# Patient Record
Sex: FEMALE | Race: BLACK OR AFRICAN AMERICAN | NOT HISPANIC OR LATINO | Employment: OTHER | URBAN - METROPOLITAN AREA
[De-identification: names, ages, dates, MRNs, and addresses within clinical notes are randomized per-mention and may not be internally consistent; named-entity substitution may affect disease eponyms.]

---

## 2017-01-17 ENCOUNTER — HISTORICAL (OUTPATIENT)
Dept: RADIOLOGY | Facility: HOSPITAL | Age: 32
End: 2017-01-17

## 2022-04-11 ENCOUNTER — HISTORICAL (OUTPATIENT)
Dept: ADMINISTRATIVE | Facility: HOSPITAL | Age: 37
End: 2022-04-11

## 2022-04-24 VITALS
WEIGHT: 293 LBS | SYSTOLIC BLOOD PRESSURE: 120 MMHG | BODY MASS INDEX: 43.4 KG/M2 | DIASTOLIC BLOOD PRESSURE: 87 MMHG | HEIGHT: 69 IN

## 2022-11-18 ENCOUNTER — HOSPITAL ENCOUNTER (EMERGENCY)
Facility: HOSPITAL | Age: 37
Discharge: HOME | End: 2022-11-18
Attending: EMERGENCY MEDICINE

## 2022-11-18 VITALS
DIASTOLIC BLOOD PRESSURE: 90 MMHG | BODY MASS INDEX: 41.02 KG/M2 | OXYGEN SATURATION: 99 % | HEART RATE: 83 BPM | TEMPERATURE: 98 F | RESPIRATION RATE: 16 BRPM | HEIGHT: 71 IN | SYSTOLIC BLOOD PRESSURE: 132 MMHG | WEIGHT: 293 LBS

## 2022-11-18 DIAGNOSIS — R07.9 CHEST PAIN: ICD-10-CM

## 2022-11-18 DIAGNOSIS — R07.89 ATYPICAL CHEST PAIN: Primary | ICD-10-CM

## 2022-11-18 DIAGNOSIS — I10 PRIMARY HYPERTENSION: ICD-10-CM

## 2022-11-18 LAB
ALBUMIN SERPL-MCNC: 3.6 GM/DL (ref 3.5–5)
ALBUMIN/GLOB SERPL: 0.9 RATIO (ref 1.1–2)
ALP SERPL-CCNC: 75 UNIT/L (ref 40–150)
ALT SERPL-CCNC: 35 UNIT/L (ref 0–55)
AST SERPL-CCNC: 21 UNIT/L (ref 5–34)
BASOPHILS # BLD AUTO: 0.03 X10(3)/MCL (ref 0–0.2)
BASOPHILS NFR BLD AUTO: 0.3 %
BILIRUBIN DIRECT+TOT PNL SERPL-MCNC: 0.4 MG/DL
BNP BLD-MCNC: <10 PG/ML
BUN SERPL-MCNC: 9.2 MG/DL (ref 7–18.7)
CALCIUM SERPL-MCNC: 9.3 MG/DL (ref 8.4–10.2)
CHLORIDE SERPL-SCNC: 105 MMOL/L (ref 98–107)
CO2 SERPL-SCNC: 26 MMOL/L (ref 22–29)
CREAT SERPL-MCNC: 0.87 MG/DL (ref 0.55–1.02)
EOSINOPHIL # BLD AUTO: 0.36 X10(3)/MCL (ref 0–0.9)
EOSINOPHIL NFR BLD AUTO: 3.9 %
ERYTHROCYTE [DISTWIDTH] IN BLOOD BY AUTOMATED COUNT: 13.2 % (ref 11.5–17)
GFR SERPLBLD CREATININE-BSD FMLA CKD-EPI: >60 MLS/MIN/1.73/M2
GLOBULIN SER-MCNC: 4.2 GM/DL (ref 2.4–3.5)
GLUCOSE SERPL-MCNC: 76 MG/DL (ref 74–100)
HCT VFR BLD AUTO: 40.8 % (ref 37–47)
HGB BLD-MCNC: 13.6 GM/DL (ref 12–16)
IMM GRANULOCYTES # BLD AUTO: 0.01 X10(3)/MCL (ref 0–0.04)
IMM GRANULOCYTES NFR BLD AUTO: 0.1 %
LYMPHOCYTES # BLD AUTO: 3.02 X10(3)/MCL (ref 0.6–4.6)
LYMPHOCYTES NFR BLD AUTO: 32.9 %
MCH RBC QN AUTO: 29.3 PG (ref 27–31)
MCHC RBC AUTO-ENTMCNC: 33.3 MG/DL (ref 33–36)
MCV RBC AUTO: 87.9 FL (ref 80–94)
MONOCYTES # BLD AUTO: 0.44 X10(3)/MCL (ref 0.1–1.3)
MONOCYTES NFR BLD AUTO: 4.8 %
NEUTROPHILS # BLD AUTO: 5.3 X10(3)/MCL (ref 2.1–9.2)
NEUTROPHILS NFR BLD AUTO: 58 %
NRBC BLD AUTO-RTO: 0 %
PLATELET # BLD AUTO: 275 X10(3)/MCL (ref 130–400)
PMV BLD AUTO: 10.1 FL (ref 7.4–10.4)
POTASSIUM SERPL-SCNC: 4.2 MMOL/L (ref 3.5–5.1)
PROT SERPL-MCNC: 7.8 GM/DL (ref 6.4–8.3)
RBC # BLD AUTO: 4.64 X10(6)/MCL (ref 4.2–5.4)
SODIUM SERPL-SCNC: 140 MMOL/L (ref 136–145)
TROPONIN I SERPL-MCNC: <0.01 NG/ML (ref 0–0.04)
WBC # SPEC AUTO: 9.2 X10(3)/MCL (ref 4.5–11.5)

## 2022-11-18 PROCEDURE — 93005 ELECTROCARDIOGRAM TRACING: CPT

## 2022-11-18 PROCEDURE — 99285 EMERGENCY DEPT VISIT HI MDM: CPT | Mod: 25

## 2022-11-18 PROCEDURE — 83880 ASSAY OF NATRIURETIC PEPTIDE: CPT | Performed by: EMERGENCY MEDICINE

## 2022-11-18 PROCEDURE — 80053 COMPREHEN METABOLIC PANEL: CPT | Performed by: EMERGENCY MEDICINE

## 2022-11-18 PROCEDURE — 85025 COMPLETE CBC W/AUTO DIFF WBC: CPT | Performed by: EMERGENCY MEDICINE

## 2022-11-18 PROCEDURE — 84484 ASSAY OF TROPONIN QUANT: CPT | Performed by: EMERGENCY MEDICINE

## 2022-11-18 PROCEDURE — 93010 EKG 12-LEAD: ICD-10-PCS | Mod: ,,, | Performed by: INTERNAL MEDICINE

## 2022-11-18 PROCEDURE — 93010 ELECTROCARDIOGRAM REPORT: CPT | Mod: ,,, | Performed by: INTERNAL MEDICINE

## 2022-11-18 RX ORDER — LISINOPRIL 20 MG/1
20 TABLET ORAL DAILY
Qty: 90 TABLET | Refills: 3 | Status: SHIPPED | OUTPATIENT
Start: 2022-11-18 | End: 2023-11-18

## 2022-11-18 NOTE — ED TRIAGE NOTES
"Pt complaint of "chest tightness/pains with increased HR over the past 3 days" Pt presents witho chest tightness/pain at present  "

## 2022-11-18 NOTE — ED PROVIDER NOTES
"Encounter Date: 11/18/2022       History     Chief Complaint   Patient presents with    Chest Pain     Pt complaint of "chest tightness/pains with increased HR over the past 3 days"     The history is provided by the patient. No  was used.   Chest Pain  The current episode started several days ago. Duration of episode(s) is 5 seconds. Chest pain occurs intermittently. The chest pain is unchanged. The chest pain is currently at 0/10. The quality of the pain is described as sharp. The pain does not radiate. Pertinent negatives for primary symptoms include no fever, no shortness of breath and no nausea.   Pertinent negatives for associated symptoms include no weakness. She tried nothing for the symptoms. Risk factors include obesity.   Her past medical history is significant for hypertension.   Admits being out of BP medication "for a good while".  CP episodes had been fleeting, lasting 5 - 10 seconds, but had an episode that lasted about 5 minutes this AM.    Review of patient's allergies indicates:  No Known Allergies  No past medical history on file. HTN  No past surgical history on file. Ectopic pregnancy; lap kathie  No family history on file.     Review of Systems   Constitutional:  Negative for fever.   HENT:  Negative for sore throat.    Respiratory:  Negative for shortness of breath.    Cardiovascular:  Positive for chest pain.   Gastrointestinal:  Negative for nausea.   Genitourinary:  Negative for dysuria.   Musculoskeletal:  Negative for back pain.   Skin:  Negative for rash.   Neurological:  Negative for weakness.   Hematological:  Does not bruise/bleed easily.     Physical Exam     Initial Vitals [11/18/22 1220]   BP Pulse Resp Temp SpO2   (!) 156/123 93 18 98 °F (36.7 °C) 100 %      MAP       --         Physical Exam    Nursing note and vitals reviewed.  Constitutional: She appears well-developed and well-nourished.   HENT:   Head: Normocephalic and atraumatic.   Right Ear: External " ear normal.   Left Ear: External ear normal.   Eyes: Conjunctivae and EOM are normal. Pupils are equal, round, and reactive to light.   Neck: Neck supple.   Normal range of motion.  Cardiovascular:  Normal rate, regular rhythm, normal heart sounds and intact distal pulses.           Pulmonary/Chest: Breath sounds normal.   Abdominal: Abdomen is soft. Bowel sounds are normal.   obese   Musculoskeletal:         General: Normal range of motion.      Cervical back: Normal range of motion and neck supple.     Neurological: She is alert and oriented to person, place, and time. GCS score is 15. GCS eye subscore is 4. GCS verbal subscore is 5. GCS motor subscore is 6.   Skin: Skin is warm and dry. Capillary refill takes less than 2 seconds.   Psychiatric: She has a normal mood and affect. Her behavior is normal. Judgment and thought content normal.       ED Course   Procedures  Labs Reviewed   COMPREHENSIVE METABOLIC PANEL - Abnormal; Notable for the following components:       Result Value    Globulin 4.2 (*)     Albumin/Globulin Ratio 0.9 (*)     All other components within normal limits   TROPONIN I - Normal   B-TYPE NATRIURETIC PEPTIDE - Normal   CBC W/ AUTO DIFFERENTIAL    Narrative:     The following orders were created for panel order CBC auto differential.  Procedure                               Abnormality         Status                     ---------                               -----------         ------                     CBC with Differential[[id number]]                            Final result                 Please view results for these tests on the individual orders.   CBC WITH DIFFERENTIAL     EKG Readings: (Independently Interpreted)   Initial Reading: No STEMI. Rhythm: Normal Sinus Rhythm. Heart Rate: 92. Ectopy: No Ectopy. Conduction: Normal. ST Segments: Normal ST Segments. T Waves: Normal. Axis: Normal. Clinical Impression: Normal Sinus Rhythm     Imaging Results              X-Ray Chest AP Portable  (Final result)  Result time 11/18/22 13:30:32      Final result by Theresa Ray MD (11/18/22 13:30:32)                   Impression:      No acute abnormality of the chest.      Electronically signed by: Theresa Ray  Date:    11/18/2022  Time:    13:30               Narrative:    EXAMINATION:  XR CHEST AP PORTABLE    CLINICAL HISTORY:  chest pain;    COMPARISON:  Chest x-ray dated 05/09/2021    FINDINGS:  The heart is normal in size.  The lungs are clear without focal consolidation.  There is no pleural effusion or visible pneumothorax.                                       Medications - No data to display                           Clinical Impression:   Final diagnoses:  [R07.9] Chest pain  [R07.89] Atypical chest pain (Primary)  [I10] Primary hypertension      ED Disposition Condition    Discharge Stable          ED Prescriptions       Medication Sig Dispense Start Date End Date Auth. Provider    lisinopriL (PRINIVIL,ZESTRIL) 20 MG tablet Take 1 tablet (20 mg total) by mouth once daily. 90 tablet 11/18/2022 11/18/2023 Minh Mcgee MD          Follow-up Information       Follow up With Specialties Details Why Contact Info    Follow up with your primary care provider in 2 weeks if not improved                 Minh Mcgee MD  11/18/22 7508

## 2023-09-18 DIAGNOSIS — R19.03 RIGHT LOWER QUADRANT ABDOMINAL SWELLING, MASS AND LUMP: Primary | ICD-10-CM

## 2023-09-29 ENCOUNTER — HOSPITAL ENCOUNTER (OUTPATIENT)
Dept: RADIOLOGY | Facility: HOSPITAL | Age: 38
Discharge: HOME | End: 2023-09-29
Attending: NURSE PRACTITIONER
Payer: MEDICAID

## 2023-09-29 DIAGNOSIS — R19.03 RIGHT LOWER QUADRANT ABDOMINAL SWELLING, MASS AND LUMP: ICD-10-CM

## 2023-09-29 PROCEDURE — 76856 US EXAM PELVIC COMPLETE: CPT | Mod: TC

## 2023-11-26 ENCOUNTER — HOSPITAL ENCOUNTER (EMERGENCY)
Facility: HOSPITAL | Age: 38
Discharge: HOME | End: 2023-11-26
Attending: EMERGENCY MEDICINE
Payer: MEDICAID

## 2023-11-26 VITALS
SYSTOLIC BLOOD PRESSURE: 166 MMHG | BODY MASS INDEX: 43.4 KG/M2 | OXYGEN SATURATION: 99 % | HEIGHT: 69 IN | RESPIRATION RATE: 20 BRPM | TEMPERATURE: 99 F | WEIGHT: 293 LBS | HEART RATE: 98 BPM | DIASTOLIC BLOOD PRESSURE: 106 MMHG

## 2023-11-26 DIAGNOSIS — J02.9 PHARYNGITIS, UNSPECIFIED ETIOLOGY: Primary | ICD-10-CM

## 2023-11-26 DIAGNOSIS — I10 SEVERE HYPERTENSION: ICD-10-CM

## 2023-11-26 LAB
FLUAV AG UPPER RESP QL IA.RAPID: NOT DETECTED
FLUBV AG UPPER RESP QL IA.RAPID: NOT DETECTED
RSV A 5' UTR RNA NPH QL NAA+PROBE: NOT DETECTED
SARS-COV-2 RNA RESP QL NAA+PROBE: NOT DETECTED
STREP A PCR (OHS): NOT DETECTED

## 2023-11-26 PROCEDURE — 0241U COVID/RSV/FLU A&B PCR: CPT | Performed by: EMERGENCY MEDICINE

## 2023-11-26 PROCEDURE — 87651 STREP A DNA AMP PROBE: CPT | Performed by: EMERGENCY MEDICINE

## 2023-11-26 PROCEDURE — 99284 EMERGENCY DEPT VISIT MOD MDM: CPT

## 2023-11-26 PROCEDURE — 25000003 PHARM REV CODE 250: Performed by: EMERGENCY MEDICINE

## 2023-11-26 PROCEDURE — 96372 THER/PROPH/DIAG INJ SC/IM: CPT | Performed by: EMERGENCY MEDICINE

## 2023-11-26 PROCEDURE — 63600175 PHARM REV CODE 636 W HCPCS: Performed by: EMERGENCY MEDICINE

## 2023-11-26 RX ORDER — DEXAMETHASONE SODIUM PHOSPHATE 4 MG/ML
8 INJECTION, SOLUTION INTRA-ARTICULAR; INTRALESIONAL; INTRAMUSCULAR; INTRAVENOUS; SOFT TISSUE
Status: COMPLETED | OUTPATIENT
Start: 2023-11-26 | End: 2023-11-26

## 2023-11-26 RX ORDER — IBUPROFEN 800 MG/1
800 TABLET ORAL EVERY 8 HOURS PRN
Qty: 20 TABLET | Refills: 0 | Status: SHIPPED | OUTPATIENT
Start: 2023-11-26

## 2023-11-26 RX ORDER — IBUPROFEN 400 MG/1
800 TABLET ORAL
Status: COMPLETED | OUTPATIENT
Start: 2023-11-26 | End: 2023-11-26

## 2023-11-26 RX ORDER — AMLODIPINE BESYLATE 5 MG/1
10 TABLET ORAL DAILY
Qty: 30 TABLET | Refills: 1 | Status: SHIPPED | OUTPATIENT
Start: 2023-11-26

## 2023-11-26 RX ORDER — HYDROCODONE BITARTRATE AND ACETAMINOPHEN 5; 325 MG/1; MG/1
1 TABLET ORAL EVERY 6 HOURS PRN
Qty: 12 TABLET | Refills: 0 | Status: SHIPPED | OUTPATIENT
Start: 2023-11-26

## 2023-11-26 RX ORDER — AMOXICILLIN 875 MG/1
875 TABLET, FILM COATED ORAL EVERY 12 HOURS
Qty: 20 TABLET | Refills: 0 | Status: SHIPPED | OUTPATIENT
Start: 2023-11-26 | End: 2023-12-06

## 2023-11-26 RX ADMIN — IBUPROFEN 800 MG: 400 TABLET ORAL at 03:11

## 2023-11-26 RX ADMIN — DEXAMETHASONE SODIUM PHOSPHATE 8 MG: 4 INJECTION, SOLUTION INTRA-ARTICULAR; INTRALESIONAL; INTRAMUSCULAR; INTRAVENOUS; SOFT TISSUE at 03:11

## 2023-11-26 NOTE — ED PROVIDER NOTES
Encounter Date: 11/26/2023       History     Chief Complaint   Patient presents with    Sore Throat     Sore throat for 2 days       38-year-old female complains of a 2 day history of sore throat which is getting progressively worse.  It is very painful to swallow.  She has no runny nose, cough, fever, or other complaints.  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.        Review of patient's allergies indicates:  No Known Allergies  No past medical history on file.  No past surgical history on file.  No family history on file.     Review of Systems   HENT:  Positive for sore throat.    All other systems reviewed and are negative.      Physical Exam     Initial Vitals [11/26/23 0234]   BP Pulse Resp Temp SpO2   (!) 168/113 98 20 98.5 °F (36.9 °C) 99 %      MAP       --         Physical Exam    Nursing note and vitals reviewed.  Constitutional: She appears well-developed and well-nourished. She is not diaphoretic. No distress.   HENT:   Head: Normocephalic and atraumatic.     Pharyngeal erythema noted with purulence discharge to her tonsils, uvula midline, soft palate normal, no trismus, no stridor   Eyes: Conjunctivae are normal. Pupils are equal, round, and reactive to light.   Neck: Neck supple.   Cardiovascular:  Normal rate, regular rhythm, normal heart sounds and intact distal pulses.           Pulmonary/Chest: Breath sounds normal. No respiratory distress. She has no wheezes. She has no rhonchi. She has no rales.   Abdominal: Abdomen is soft. She exhibits no distension. There is no abdominal tenderness. There is no guarding.   Musculoskeletal:         General: No tenderness or edema. Normal range of motion.      Cervical back: Neck supple.     Neurological: She is alert and oriented to person, place, and time.   Skin: Skin is warm and dry. Capillary refill takes less than 2 seconds. No rash noted.   Psychiatric: She has a normal mood and affect. Thought content normal.         ED Course    Procedures  Labs Reviewed   COVID/RSV/FLU A&B PCR - Normal    Narrative:     The Xpert Xpress SARS-CoV-2/FLU/RSV plus is a rapid, multiplexed real-time PCR test intended for the simultaneous qualitative detection and differentiation of SARS-CoV-2, Influenza A, Influenza B, and respiratory syncytial virus (RSV) viral RNA in either nasopharyngeal swab or nasal swab specimens.         STREP GROUP A BY PCR - Normal    Narrative:     The Xpert Xpress Strep A test is a rapid, qualitative in vitro diagnostic test for the detection of Streptococcus pyogenes (Group A ß-hemolytic Streptococcus, Strep A) in throat swab specimens from patients with signs and symptoms of pharyngitis.            Imaging Results    None          Medications   ibuprofen tablet 800 mg (800 mg Oral Given 11/26/23 0307)   dexAMETHasone injection 8 mg (8 mg Intramuscular Given 11/26/23 0307)     Medical Decision Making    38-year-old female complains of a 2 day history of sore throat which is getting progressively worse.  It is very painful to swallow.  She has no runny nose, cough, fever, or other complaints.  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       Differential diagnosis includes but is not limited to strep pharyngitis, viral syndrome, mononucleosis    Amount and/or Complexity of Data Reviewed  Labs: ordered. Decision-making details documented in ED Course.  Discussion of management or test interpretation with external provider(s):   Patient was seen and evaluated in the emergency department with history, physical exam, testing for COVID, flu, RSV, and strep.  Her swabs are negative but she does have enlarged tonsils for purulence discharge.  I discussed doing blood work with her including an Monospot test.  However, she states she just wants to be treated with antibiotics and will follow-up with her PCP if needed.  She does have severely elevated blood pressure and states that despite taking her medication every day, it  is staying elevated.  She takes lisinopril hydrochlorothiazide.  I discussed with her the pros and cons of adding a 2nd medication and she would like to do that.  I have prescribed amlodipine 5 mg daily.  I discussed with her the most common side effect of this medication which can be lower extremity swelling and she verbalized understanding.   ER return precautions were discussed    Risk  Prescription drug management.               ED Course as of 11/26/23 0543   [place] Nov 26, 2023 0345 Influenza A, Molecular: Not Detected [SH]   0345 Influenza B, Molecular: Not Detected [SH]   0345 RSV Ag by Molecular Method: Not Detected [SH]   0345 SARS-CoV2 (COVID-19) Qualitative PCR: Not Detected [SH]   0345 STREP A PCR ([facility]): Not Detected [SH]      ED Course User Index  [SH] Sera Mcclellan MD                        Clinical Impression:  Final diagnoses:  [J02.9] Pharyngitis, unspecified etiology (Primary)  [I10] Severe hypertension          ED Disposition Condition    Discharge Stable          ED Prescriptions       Medication Sig Dispense Start Date End Date Auth. Provider    HYDROcodone-acetaminophen (NORCO) 5-325 mg per tablet Take 1 tablet by mouth every 6 (six) hours as needed for Pain. 12 tablet 11/26/2023 -- Sera Mcclellan MD    ibuprofen (ADVIL,MOTRIN) 800 MG tablet Take 1 tablet (800 mg total) by mouth every 8 (eight) hours as needed for Pain. 20 tablet 11/26/2023 -- Sera Mcclellan MD    amoxicillin (AMOXIL) 875 MG tablet Take 1 tablet (875 mg total) by mouth every 12 (twelve) hours. for 10 days 20 tablet 11/26/2023 12/6/2023 Sera Mcclellan MD    amLODIPine (NORVASC) 5 MG tablet Take 2 tablets (10 mg total) by mouth once daily. 30 tablet 11/26/2023 -- Sera Mcclellan MD          Follow-up Information       Follow up With Specialties Details Why Contact Info    PCP  Schedule an appointment as soon as possible for a visit                Sera Mcclellan MD  11/26/23 0543

## 2023-11-26 NOTE — Clinical Note
"Isa"Melany Reyes was seen and treated in our emergency department on 11/26/2023.  She may return to work on 11/29/2023.       If you have any questions or concerns, please don't hesitate to call.      Sera Mcclellan MD"

## 2024-05-29 ENCOUNTER — OFFICE VISIT (OUTPATIENT)
Dept: FAMILY MEDICINE | Facility: CLINIC | Age: 39
End: 2024-05-29
Payer: MEDICAID

## 2024-05-29 VITALS
TEMPERATURE: 99 F | HEART RATE: 95 BPM | BODY MASS INDEX: 43.4 KG/M2 | DIASTOLIC BLOOD PRESSURE: 82 MMHG | HEIGHT: 69 IN | OXYGEN SATURATION: 100 % | SYSTOLIC BLOOD PRESSURE: 118 MMHG | WEIGHT: 293 LBS

## 2024-05-29 DIAGNOSIS — Z00.00 WELLNESS EXAMINATION: Primary | ICD-10-CM

## 2024-05-29 DIAGNOSIS — E66.01 CLASS 3 SEVERE OBESITY DUE TO EXCESS CALORIES WITH SERIOUS COMORBIDITY AND BODY MASS INDEX (BMI) OF 60.0 TO 69.9 IN ADULT: ICD-10-CM

## 2024-05-29 DIAGNOSIS — F17.200 SMOKER: ICD-10-CM

## 2024-05-29 DIAGNOSIS — I10 HYPERTENSION, UNSPECIFIED TYPE: ICD-10-CM

## 2024-05-29 DIAGNOSIS — K21.9 GASTROESOPHAGEAL REFLUX DISEASE, UNSPECIFIED WHETHER ESOPHAGITIS PRESENT: ICD-10-CM

## 2024-05-29 DIAGNOSIS — I10 ESSENTIAL (PRIMARY) HYPERTENSION: ICD-10-CM

## 2024-05-29 DIAGNOSIS — Z82.49 FAMILY HISTORY OF HEART DISEASE: ICD-10-CM

## 2024-05-29 DIAGNOSIS — E66.01 CLASS 3 SEVERE OBESITY WITH BODY MASS INDEX (BMI) OF 60.0 TO 69.9 IN ADULT, UNSPECIFIED OBESITY TYPE, UNSPECIFIED WHETHER SERIOUS COMORBIDITY PRESENT: ICD-10-CM

## 2024-05-29 DIAGNOSIS — F41.8 ANXIETY WITH DEPRESSION: ICD-10-CM

## 2024-05-29 PROBLEM — E66.813 CLASS 3 SEVERE OBESITY DUE TO EXCESS CALORIES WITH SERIOUS COMORBIDITY AND BODY MASS INDEX (BMI) OF 60.0 TO 69.9 IN ADULT: Status: ACTIVE | Noted: 2024-05-29

## 2024-05-29 LAB
25(OH)D3+25(OH)D2 SERPL-MCNC: 14 NG/ML (ref 30–80)
ALBUMIN SERPL-MCNC: 3.3 G/DL (ref 3.5–5)
ALBUMIN/GLOB SERPL: 0.8 RATIO (ref 1.1–2)
ALP SERPL-CCNC: 69 UNIT/L (ref 40–150)
ALT SERPL-CCNC: 30 UNIT/L (ref 0–55)
ANION GAP SERPL CALC-SCNC: 7 MEQ/L
AST SERPL-CCNC: 19 UNIT/L (ref 5–34)
BACTERIA #/AREA URNS AUTO: ABNORMAL /HPF
BASOPHILS # BLD AUTO: 0.03 X10(3)/MCL
BASOPHILS NFR BLD AUTO: 0.3 %
BILIRUB SERPL-MCNC: 0.3 MG/DL
BILIRUB UR QL STRIP.AUTO: NEGATIVE
BUN SERPL-MCNC: 12 MG/DL (ref 7–18.7)
CALCIUM SERPL-MCNC: 9.1 MG/DL (ref 8.4–10.2)
CHLORIDE SERPL-SCNC: 108 MMOL/L (ref 98–107)
CHOLEST SERPL-MCNC: 155 MG/DL
CHOLEST/HDLC SERPL: 4 {RATIO} (ref 0–5)
CLARITY UR: CLEAR
CO2 SERPL-SCNC: 24 MMOL/L (ref 22–29)
COLOR UR AUTO: COLORLESS
CREAT SERPL-MCNC: 0.92 MG/DL (ref 0.55–1.02)
CREAT/UREA NIT SERPL: 13
EOSINOPHIL # BLD AUTO: 0.21 X10(3)/MCL (ref 0–0.9)
EOSINOPHIL NFR BLD AUTO: 2.3 %
ERYTHROCYTE [DISTWIDTH] IN BLOOD BY AUTOMATED COUNT: 13.2 % (ref 11.5–17)
EST. AVERAGE GLUCOSE BLD GHB EST-MCNC: 102.5 MG/DL
GFR SERPLBLD CREATININE-BSD FMLA CKD-EPI: >60 ML/MIN/1.73/M2
GLOBULIN SER-MCNC: 4.3 GM/DL (ref 2.4–3.5)
GLUCOSE SERPL-MCNC: 89 MG/DL (ref 74–100)
GLUCOSE UR QL STRIP: NORMAL
HBA1C MFR BLD: 5.2 %
HCT VFR BLD AUTO: 39.3 % (ref 37–47)
HCV AB SERPL QL IA: NONREACTIVE
HDLC SERPL-MCNC: 40 MG/DL (ref 35–60)
HGB BLD-MCNC: 12.9 G/DL (ref 12–16)
HGB UR QL STRIP: NEGATIVE
HIV 1+2 AB+HIV1 P24 AG SERPL QL IA: NONREACTIVE
HYALINE CASTS #/AREA URNS LPF: ABNORMAL /LPF
IMM GRANULOCYTES # BLD AUTO: 0.02 X10(3)/MCL (ref 0–0.04)
IMM GRANULOCYTES NFR BLD AUTO: 0.2 %
KETONES UR QL STRIP: NEGATIVE
LDLC SERPL CALC-MCNC: 100 MG/DL (ref 50–140)
LEUKOCYTE ESTERASE UR QL STRIP: NEGATIVE
LYMPHOCYTES # BLD AUTO: 1.88 X10(3)/MCL (ref 0.6–4.6)
LYMPHOCYTES NFR BLD AUTO: 20.2 %
MCH RBC QN AUTO: 29.7 PG (ref 27–31)
MCHC RBC AUTO-ENTMCNC: 32.8 G/DL (ref 33–36)
MCV RBC AUTO: 90.3 FL (ref 80–94)
MONOCYTES # BLD AUTO: 0.43 X10(3)/MCL (ref 0.1–1.3)
MONOCYTES NFR BLD AUTO: 4.6 %
NEUTROPHILS # BLD AUTO: 6.74 X10(3)/MCL (ref 2.1–9.2)
NEUTROPHILS NFR BLD AUTO: 72.4 %
NITRITE UR QL STRIP: NEGATIVE
NRBC BLD AUTO-RTO: 0 %
PH UR STRIP: 7 [PH]
PLATELET # BLD AUTO: 208 X10(3)/MCL (ref 130–400)
PMV BLD AUTO: 11.3 FL (ref 7.4–10.4)
POTASSIUM SERPL-SCNC: 4.2 MMOL/L (ref 3.5–5.1)
PROT SERPL-MCNC: 7.6 GM/DL (ref 6.4–8.3)
PROT UR QL STRIP: NEGATIVE
RBC # BLD AUTO: 4.35 X10(6)/MCL (ref 4.2–5.4)
RBC #/AREA URNS AUTO: ABNORMAL /HPF
SODIUM SERPL-SCNC: 139 MMOL/L (ref 136–145)
SP GR UR STRIP.AUTO: 1.01 (ref 1–1.03)
SQUAMOUS #/AREA URNS LPF: ABNORMAL /HPF
T4 FREE SERPL-MCNC: 0.88 NG/DL (ref 0.7–1.48)
TRIGL SERPL-MCNC: 76 MG/DL (ref 37–140)
TSH SERPL-ACNC: 1.11 UIU/ML (ref 0.35–4.94)
UROBILINOGEN UR STRIP-ACNC: NORMAL
VLDLC SERPL CALC-MCNC: 15 MG/DL
WBC # SPEC AUTO: 9.31 X10(3)/MCL (ref 4.5–11.5)
WBC #/AREA URNS AUTO: ABNORMAL /HPF

## 2024-05-29 PROCEDURE — 99214 OFFICE O/P EST MOD 30 MIN: CPT | Mod: 25,S$PBB,, | Performed by: STUDENT IN AN ORGANIZED HEALTH CARE EDUCATION/TRAINING PROGRAM

## 2024-05-29 PROCEDURE — 1159F MED LIST DOCD IN RCRD: CPT | Mod: CPTII,,, | Performed by: STUDENT IN AN ORGANIZED HEALTH CARE EDUCATION/TRAINING PROGRAM

## 2024-05-29 PROCEDURE — 81001 URINALYSIS AUTO W/SCOPE: CPT | Performed by: STUDENT IN AN ORGANIZED HEALTH CARE EDUCATION/TRAINING PROGRAM

## 2024-05-29 PROCEDURE — 36415 COLL VENOUS BLD VENIPUNCTURE: CPT | Performed by: STUDENT IN AN ORGANIZED HEALTH CARE EDUCATION/TRAINING PROGRAM

## 2024-05-29 PROCEDURE — 3079F DIAST BP 80-89 MM HG: CPT | Mod: CPTII,,, | Performed by: STUDENT IN AN ORGANIZED HEALTH CARE EDUCATION/TRAINING PROGRAM

## 2024-05-29 PROCEDURE — 3074F SYST BP LT 130 MM HG: CPT | Mod: CPTII,,, | Performed by: STUDENT IN AN ORGANIZED HEALTH CARE EDUCATION/TRAINING PROGRAM

## 2024-05-29 PROCEDURE — 4010F ACE/ARB THERAPY RXD/TAKEN: CPT | Mod: CPTII,,, | Performed by: STUDENT IN AN ORGANIZED HEALTH CARE EDUCATION/TRAINING PROGRAM

## 2024-05-29 PROCEDURE — 85025 COMPLETE CBC W/AUTO DIFF WBC: CPT | Performed by: STUDENT IN AN ORGANIZED HEALTH CARE EDUCATION/TRAINING PROGRAM

## 2024-05-29 PROCEDURE — 83036 HEMOGLOBIN GLYCOSYLATED A1C: CPT | Performed by: STUDENT IN AN ORGANIZED HEALTH CARE EDUCATION/TRAINING PROGRAM

## 2024-05-29 PROCEDURE — 84439 ASSAY OF FREE THYROXINE: CPT | Performed by: STUDENT IN AN ORGANIZED HEALTH CARE EDUCATION/TRAINING PROGRAM

## 2024-05-29 PROCEDURE — 84443 ASSAY THYROID STIM HORMONE: CPT | Performed by: STUDENT IN AN ORGANIZED HEALTH CARE EDUCATION/TRAINING PROGRAM

## 2024-05-29 PROCEDURE — 99406 BEHAV CHNG SMOKING 3-10 MIN: CPT | Mod: S$PBB,,, | Performed by: STUDENT IN AN ORGANIZED HEALTH CARE EDUCATION/TRAINING PROGRAM

## 2024-05-29 PROCEDURE — 99385 PREV VISIT NEW AGE 18-39: CPT | Mod: S$PBB,,, | Performed by: STUDENT IN AN ORGANIZED HEALTH CARE EDUCATION/TRAINING PROGRAM

## 2024-05-29 PROCEDURE — 3008F BODY MASS INDEX DOCD: CPT | Mod: CPTII,,, | Performed by: STUDENT IN AN ORGANIZED HEALTH CARE EDUCATION/TRAINING PROGRAM

## 2024-05-29 PROCEDURE — 99214 OFFICE O/P EST MOD 30 MIN: CPT | Mod: PBBFAC,PN | Performed by: STUDENT IN AN ORGANIZED HEALTH CARE EDUCATION/TRAINING PROGRAM

## 2024-05-29 PROCEDURE — 82306 VITAMIN D 25 HYDROXY: CPT | Performed by: STUDENT IN AN ORGANIZED HEALTH CARE EDUCATION/TRAINING PROGRAM

## 2024-05-29 PROCEDURE — 86803 HEPATITIS C AB TEST: CPT | Performed by: STUDENT IN AN ORGANIZED HEALTH CARE EDUCATION/TRAINING PROGRAM

## 2024-05-29 PROCEDURE — 80053 COMPREHEN METABOLIC PANEL: CPT | Performed by: STUDENT IN AN ORGANIZED HEALTH CARE EDUCATION/TRAINING PROGRAM

## 2024-05-29 PROCEDURE — 87389 HIV-1 AG W/HIV-1&-2 AB AG IA: CPT | Performed by: STUDENT IN AN ORGANIZED HEALTH CARE EDUCATION/TRAINING PROGRAM

## 2024-05-29 PROCEDURE — 80061 LIPID PANEL: CPT | Performed by: STUDENT IN AN ORGANIZED HEALTH CARE EDUCATION/TRAINING PROGRAM

## 2024-05-29 RX ORDER — IBUPROFEN 200 MG
1 TABLET ORAL DAILY
Qty: 30 PATCH | Refills: 0 | Status: SHIPPED | OUTPATIENT
Start: 2024-05-29

## 2024-05-29 RX ORDER — LISINOPRIL 20 MG/1
20 TABLET ORAL DAILY
Qty: 90 TABLET | Refills: 3 | Status: SHIPPED | OUTPATIENT
Start: 2024-05-29 | End: 2025-05-29

## 2024-05-29 RX ORDER — DULOXETIN HYDROCHLORIDE 30 MG/1
30 CAPSULE, DELAYED RELEASE ORAL DAILY
Qty: 90 CAPSULE | Refills: 3 | Status: SHIPPED | OUTPATIENT
Start: 2024-05-29 | End: 2025-05-29

## 2024-05-29 RX ORDER — HYDROXYZINE PAMOATE 25 MG/1
25 CAPSULE ORAL EVERY 8 HOURS PRN
Qty: 90 CAPSULE | Refills: 3 | Status: SHIPPED | OUTPATIENT
Start: 2024-05-29

## 2024-05-29 RX ORDER — CITALOPRAM 10 MG/1
10 TABLET ORAL
COMMUNITY
Start: 2024-01-11 | End: 2024-05-29

## 2024-05-29 RX ORDER — AMLODIPINE BESYLATE 5 MG/1
10 TABLET ORAL DAILY
Qty: 90 TABLET | Refills: 3 | Status: SHIPPED | OUTPATIENT
Start: 2024-05-29

## 2024-05-29 RX ORDER — PANTOPRAZOLE SODIUM 40 MG/1
40 TABLET, DELAYED RELEASE ORAL DAILY
Qty: 90 TABLET | Refills: 3 | Status: SHIPPED | OUTPATIENT
Start: 2024-05-29 | End: 2025-05-29

## 2024-05-29 RX ORDER — NICOTINE 7MG/24HR
1 PATCH, TRANSDERMAL 24 HOURS TRANSDERMAL DAILY
Qty: 30 PATCH | Refills: 0 | Status: SHIPPED | OUTPATIENT
Start: 2024-06-28

## 2024-05-29 NOTE — PROGRESS NOTES
Patient Name: Isa Reyes     : 1985    MRN: [id number]     Subjective:     Patient ID: Isa Reyes is a 38 y.o. female.    Chief Complaint:   Chief Complaint   Patient presents with    Establish Care     Patient here to establish care. She states that she has not been to a doctor in a while and she is currently out of her blood pressure medications. /82        HPI: HPI  38-year-old female presents to clinic to establish care and have wellness exam   She also has several medical complaints    Hypertension  Blood pressure in clinic today 118/82   Patient states she had been receiving medications in the urgent care in his currently taking amlodipine 10 mg daily as well as lisinopril 20 mg daily would like to have refills of this medication   Denies any issues or complaints    Smoking  Patient reports smoking approximately 2-3 cigarettes per day and she would like to quit  Amenable to patches and referral to smoking cessation    Anxiety with depression  Patient is not currently taking medication now but had previously been on [id number] mg daily   States that she felt this was not completely controlling her anxiety and she would like to discuss a new medication  Denies panic attacks however states that she did have 1 episode where her anxiety was very bad last week and she had to repeatedly do deep breaths and calm herself down    Obesity with body mass index of 61  Patient is reporting that she has always had difficulty with her weight but has gained 30 lb since October of previous year states that 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 but states that since this time he passed away and she has not had to do as much physical   work states that she has tried to lose weight on her own but the most she ever loses is approximately 30 lb    Patient has concerns as she has a family history of heart disease in her grandparents both of them  "passed away due to heart attacks in their 60s        Also with some concerns over GERD states it has been a longstanding issue  She is tried to find foods that cause the GERD symptoms but has never had scoped is amenable to a trial of PPI  ROS:      ROS     12 point review of systems conducted, negative except as stated in the history of present illness. See HPI for details.    History:     History reviewed. No pertinent past medical history.     Past Surgical History:   Procedure Laterality Date    CHOLECYSTECTOMY      ECTOPIC PREGNANCY SURGERY         No family history on file.     Social History     Tobacco Use    Smoking status: Every Day     Current packs/day: 0.25     Types: Cigarettes    Smokeless tobacco: Never   Substance and Sexual Activity    Alcohol use: Yes    Drug use: Never    Sexual activity: Yes       Current Outpatient Medications   Medication Instructions    amLODIPine (NORVASC) 10 mg, Oral, Daily    DULoxetine (CYMBALTA) 30 mg, Oral, Daily    hydrOXYzine pamoate (VISTARIL) 25 mg, Oral, Every 8 hours PRN    lisinopriL (PRINIVIL,ZESTRIL) 20 mg, Oral, Daily    nicotine (NICODERM CQ) 14 mg/24 hr 1 patch, Transdermal, Daily    [START ON 6/28/2024] nicotine (NICODERM CQ) 7 mg/24 hr 1 patch, Transdermal, Daily    pantoprazole (PROTONIX) 40 mg, Oral, Daily        Review of patient's allergies indicates:  No Known Allergies    Objective:     Visit Vitals  /82 (BP Location: Left arm, Patient Position: Sitting)   Pulse 95   Temp 99 °F (37.2 °C) (Oral)   Ht 5' 9" (1.753 m)   Wt (!) 187.8 kg (414 lb)   LMP 05/07/2024   SpO2 100%   BMI 61.14 kg/m²       Physical Examination:     Physical Exam    Lab Results:     Chemistry:  Lab Results   Component Value Date     11/18/2022    K 4.2 11/18/2022    CHLORIDE 105 11/18/2022    BUN 9.2 11/18/2022    CREATININE 0.87 11/18/2022    EGFRNORACEVR >60 11/18/2022    GLUCOSE 76 11/18/2022    CALCIUM 9.3 11/18/2022    ALKPHOS 75 11/18/2022    LABPROT 7.8 " "11/18/2022    ALBUMIN 3.6 11/18/2022    BILIDIR 0.1 05/09/2021    IBILI 0.10 05/09/2021    AST 21 11/18/2022    ALT 35 11/18/2022    MG 1.95 05/09/2021        No results found for: "HGBA1C", "MICROALBCREA"     Hematology:  Lab Results   Component Value Date    WBC 9.2 11/18/2022    HGB 13.6 11/18/2022    HCT 40.8 11/18/2022     11/18/2022       Lipid Panel:  No results found for: "CHOL", "HDL", "LDL", "TRIG", "TOTALCHOLEST"     Urine:  No results found for: "COLORUA", "APPEARANCEUA", "SGUA", "PHUA", "PROTEINUA", "GLUCOSEUA", "KETONESUA", "BLOODUA", "NITRITESUA", "LEUKOCYTESUR", "RBCUA", "WBCUA", "BACTERIA", "SQEPUA", "HYALINECASTS", "CREATRANDUR", "PROTEINURINE", "UPROTCREA"     Assessment:          ICD-10-CM ICD-9-CM   1. Wellness examination  Z00.00 V70.0   2. Essential (primary) hypertension  I10 401.9   3. Anxiety with depression  F41.8 300.4   4. Class 3 severe obesity with body mass index (BMI) of 60.0 to 69.9 in adult, unspecified obesity type, unspecified whether serious comorbidity present  E66.01 278.01    Z68.44 V85.44   5. Family history of heart disease  Z82.49 V17.49   6. Smoker  F17.200 305.1   7. Gastroesophageal reflux disease, unspecified whether esophagitis present  K21.9 530.81   8. Hypertension, unspecified type  I10 401.9   9. Class 3 severe obesity due to excess calories with serious comorbidity and body mass index (BMI) of 60.0 to 69.9 in adult  E66.01 278.01    Z68.44 V85.44        Plan:     1. Wellness examination  Assessment & Plan:  Wellness exam is listed as below discussed health maintenance with patient  Reports that she had a Pap smear completed last year at [facility] has been obtained  Reviewed surgical history with patient with 2 ectopic  Pregnancies and gallbladder removed      Orders:  -     CBC Auto Differential; Future; Expected date: 05/29/2024  -     Comprehensive Metabolic Panel; Future; Expected date: 05/29/2024  -     Lipid Panel; Future; Expected date: " 05/29/2024  -     TSH; Future; Expected date: 05/29/2024  -     Hemoglobin A1C; Future; Expected date: 05/29/2024  -     Urinalysis; Future; Expected date: 05/29/2024  -     T4, Free; Future; Expected date: 05/29/2024  -     Vitamin D; Future; Expected date: 05/29/2024  -     Hepatitis C Antibody; Future; Expected date: 05/29/2024  -     HIV 1/2 Ag/Ab (4th Gen); Future; Expected date: 05/29/2024    2. Essential (primary) hypertension  -     Comprehensive Metabolic Panel; Future; Expected date: 05/29/2024  -     amLODIPine (NORVASC) 5 MG tablet; Take 2 tablets (10 mg total) by mouth once daily.  Dispense: 90 tablet; Refill: 3  -     lisinopriL (PRINIVIL,ZESTRIL) 20 MG tablet; Take 1 tablet (20 mg total) by mouth once daily.  Dispense: 90 tablet; Refill: 3    3. Anxiety with depression  Assessment & Plan:  Is amenable to medication change  Discussed options and patient notes that her energy level is not wear typically used to be will stay away from Zoloft  Trial of Cymbalta 30 mg daily  Also prescribed hydroxyzine pamoate 25 mg q.8 hours p.r.n. anxiety  TSH and T4      Orders:  -     TSH; Future; Expected date: 05/29/2024  -     T4, Free; Future; Expected date: 05/29/2024  -     DULoxetine (CYMBALTA) 30 MG capsule; Take 1 capsule (30 mg total) by mouth once daily.  Dispense: 90 capsule; Refill: 3  -     hydrOXYzine pamoate (VISTARIL) 25 MG Cap; Take 1 capsule (25 mg total) by mouth every 8 (eight) hours as needed (anxiety).  Dispense: 90 capsule; Refill: 3    4. Class 3 severe obesity with body mass index (BMI) of 60.0 to 69.9 in adult, unspecified obesity type, unspecified whether serious comorbidity present  -     TSH; Future; Expected date: 05/29/2024  -     T4, Free; Future; Expected date: 05/29/2024    5. Family history of heart disease  Assessment & Plan:  At patient request will refer patient to Cardiology for risk stratification  Discussed with patient controlling blood pressure, cholesterol, smoking  cessation, weight loss, exercise all will help to decrease her risk of cardiovascular events in the future.  Patient verbalized understanding    Orders:  -     Ambulatory referral/consult to Cardiology; Future; Expected date: 06/05/2024    6. Smoker  Assessment & Plan:  Spent 3 minutes discussing smoking cessation with patient patient is amenable to trying nicotine patches have prescribe Nicoderm CQ  14 mg and 7 mg also place referral to smoking cessation at next visit will see if patient would like to add Wellbutrin; did not complete this time as patient is changing depression and anxiety medication as well    Orders:  -     Ambulatory referral/consult to Smoking Cessation Program; Future; Expected date: 06/05/2024  -     nicotine (NICODERM CQ) 14 mg/24 hr; Place 1 patch onto the skin once daily.  Dispense: 30 patch; Refill: 0  -     nicotine (NICODERM CQ) 7 mg/24 hr; Place 1 patch onto the skin once daily.  Dispense: 30 patch; Refill: 0    7. Gastroesophageal reflux disease, unspecified whether esophagitis present  -     pantoprazole (PROTONIX) 40 MG tablet; Take 1 tablet (40 mg total) by mouth once daily.  Dispense: 90 tablet; Refill: 3  -     Ambulatory referral/consult to Gastroenterology; Future; Expected date: 06/05/2024    8. Hypertension, unspecified type  Assessment & Plan:  CMP today as patient has been on lisinopril but has potassium kidney function checked   Refilling patient's amlodipine at 10 mg daily as well as lisinopril at 20 mg daily      9. Class 3 severe obesity due to excess calories with serious comorbidity and body mass index (BMI) of 60.0 to 69.9 in adult  Assessment & Plan:  Discussed options with patient   Running   A1c TSH and T4  Discussed with patient that she would not qualify for bariatric surgery until she is smoke free for at least 6 weeks           Follow up in about 1 month (around 6/29/2024) for lab results, Hypertension, anxiety.    Future Appointments   Date Time Provider  Department Center   6/26/2024 10:00 AM Alicia Delong MD [facility]        Alicia Delong MD

## 2024-05-29 NOTE — ASSESSMENT & PLAN NOTE
Spent 3 minutes discussing smoking cessation with patient patient is amenable to trying nicotine patches have prescribe Nicoderm CQ  14 mg and 7 mg also place referral to smoking cessation at next visit will see if patient would like to add Wellbutrin; did not complete this time as patient is changing depression and anxiety medication as well

## 2024-05-29 NOTE — ASSESSMENT & PLAN NOTE
At patient request will refer patient to Cardiology for risk stratification  Discussed with patient controlling blood pressure, cholesterol, smoking cessation, weight loss, exercise all will help to decrease her risk of cardiovascular events in the future.  Patient verbalized understanding

## 2024-05-29 NOTE — ASSESSMENT & PLAN NOTE
Wellness exam is listed as below discussed health maintenance with patient  Reports that she had a Pap smear completed last year at [facility] has been obtained  Reviewed surgical history with patient with 2 ectopic  Pregnancies and gallbladder removed

## 2024-05-29 NOTE — ASSESSMENT & PLAN NOTE
Discussed options with patient   Running   A1c TSH and T4  Discussed with patient that she would not qualify for bariatric surgery until she is smoke free for at least 6 weeks

## 2024-05-29 NOTE — ASSESSMENT & PLAN NOTE
Is amenable to medication change  Discussed options and patient notes that her energy level is not wear typically used to be will stay away from Zoloft  Trial of Cymbalta 30 mg daily  Also prescribed hydroxyzine pamoate 25 mg q.8 hours p.r.n. anxiety  TSH and T4

## 2024-05-29 NOTE — ASSESSMENT & PLAN NOTE
CMP today as patient has been on lisinopril but has potassium kidney function checked   Refilling patient's amlodipine at 10 mg daily as well as lisinopril at 20 mg daily

## 2024-05-30 DIAGNOSIS — R79.89 LOW VITAMIN D LEVEL: Primary | ICD-10-CM

## 2024-05-30 RX ORDER — CHOLECALCIFEROL (VITAMIN D3) 1250 MCG
1250 TABLET ORAL
Qty: 24 TABLET | Refills: 1 | Status: SHIPPED | OUTPATIENT
Start: 2024-05-30

## 2024-05-30 NOTE — PROGRESS NOTES
Please let patient know that her labs came back.  We will discuss further at her next appointment, but her vitamin D is pretty low at 14. Normal is 30-80.  I am prescribing Vit D for her to take.

## 2024-06-07 ENCOUNTER — DOCUMENTATION ONLY (OUTPATIENT)
Dept: FAMILY MEDICINE | Facility: CLINIC | Age: 39
End: 2024-06-07
Payer: MEDICAID

## 2024-06-07 LAB — PAP RECOMMENDATION EXT: NORMAL

## 2024-06-26 ENCOUNTER — OFFICE VISIT (OUTPATIENT)
Dept: FAMILY MEDICINE | Facility: CLINIC | Age: 39
End: 2024-06-26
Payer: MEDICAID

## 2024-06-26 VITALS
TEMPERATURE: 99 F | HEART RATE: 87 BPM | HEIGHT: 69 IN | OXYGEN SATURATION: 95 % | WEIGHT: 293 LBS | BODY MASS INDEX: 43.4 KG/M2 | SYSTOLIC BLOOD PRESSURE: 121 MMHG | DIASTOLIC BLOOD PRESSURE: 82 MMHG

## 2024-06-26 DIAGNOSIS — F17.200 SMOKER: ICD-10-CM

## 2024-06-26 DIAGNOSIS — E66.01 CLASS 3 SEVERE OBESITY DUE TO EXCESS CALORIES WITH SERIOUS COMORBIDITY AND BODY MASS INDEX (BMI) OF 60.0 TO 69.9 IN ADULT: ICD-10-CM

## 2024-06-26 DIAGNOSIS — F41.8 ANXIETY WITH DEPRESSION: ICD-10-CM

## 2024-06-26 DIAGNOSIS — I10 HYPERTENSION, UNSPECIFIED TYPE: ICD-10-CM

## 2024-06-26 DIAGNOSIS — Z23 ENCOUNTER FOR IMMUNIZATION: Primary | ICD-10-CM

## 2024-06-26 PROBLEM — E66.813 CLASS 3 SEVERE OBESITY DUE TO EXCESS CALORIES WITH SERIOUS COMORBIDITY AND BODY MASS INDEX (BMI) OF 60.0 TO 69.9 IN ADULT: Status: RESOLVED | Noted: 2024-05-29 | Resolved: 2024-06-26

## 2024-06-26 PROCEDURE — 99213 OFFICE O/P EST LOW 20 MIN: CPT | Mod: PBBFAC,PN,25 | Performed by: STUDENT IN AN ORGANIZED HEALTH CARE EDUCATION/TRAINING PROGRAM

## 2024-06-26 PROCEDURE — 90471 IMMUNIZATION ADMIN: CPT | Mod: PBBFAC,PN

## 2024-06-26 PROCEDURE — 90677 PCV20 VACCINE IM: CPT | Mod: PBBFAC,PN

## 2024-06-26 RX ADMIN — PNEUMOCOCCAL 20-VALENT CONJUGATE VACCINE 0.5 ML
2.2; 2.2; 2.2; 2.2; 2.2; 2.2; 2.2; 2.2; 2.2; 2.2; 2.2; 2.2; 2.2; 2.2; 2.2; 2.2; 4.4; 2.2; 2.2; 2.2 INJECTION, SUSPENSION INTRAMUSCULAR at 10:06

## 2024-06-26 NOTE — ASSESSMENT & PLAN NOTE
Patient has lost 4 lb since past visit congratulated patient.    Encouraged continued healthy eating and exercise  Once patient is 6 weeks smoke free can send referral for bariatric surgery

## 2024-06-26 NOTE — PROGRESS NOTES
Patient Name: Isa Reyes     : 1985    MRN: [id number]     Subjective:     Patient ID: Isa Reyes is a 38 y.o. female.    Chief Complaint:   Chief Complaint   Patient presents with    Follow-up     Patient here for follow up to discuss weight and the change made to her anti-depression medication. /82        HPI: HPI  38-year-old female presents to clinic to clinic for lab results, Follow up of depression with anxiety, HTN and obesity    Hypertension  Blood pressure in clinic today 121/82  Patient is compliant with amlodipine 10 mg daily and lisinopril 20 mg daily  No issues or complaints    Smoking  Patient is now 1 week smoke free  States that she finds she does not miss smoking     Anxiety with depression  Last visit patient was stopped on Celexa and started on Cymbalta 30 mg daily states that she finds this dose to be very helpful for her and she would like to continue at this dose   No SI or HI   Also takes hydroxyzine  Pamoate 25 mg q.8 hours p.r.n. anxiety reports only having to take the medication at night    Obesity with body mass index of 60  Patient has lost 4 lb since last visit congratulated patient on this as well as her smoking cessation once patient is 5 additional week smoke free may refer patient to bariatric surgery program.      Chronic issues not discussed      Patient has concerns as she has a family history of heart disease in her grandparents both of them passed away due to heart attacks in their 60s        Also with some concerns over GERD states it has been a longstanding issue  She is tried to find foods that cause the GERD symptoms but has never had scoped is amenable to a trial of PPI  ROS:      ROS   ROS:    12 point review of systems conducted, negative except as stated in the history of present illness. See HPI for details.    History:     History reviewed. No pertinent past medical history.     Past Surgical History:   Procedure Laterality Date     "CHOLECYSTECTOMY      ECTOPIC PREGNANCY SURGERY         No family history on file.     Social History     Tobacco Use    Smoking status: Former     Current packs/day: 0.00     Types: Cigarettes     Quit date: 2024     Years since quittin.0    Smokeless tobacco: Never   Substance and Sexual Activity    Alcohol use: Yes    Drug use: Never    Sexual activity: Yes       Current Outpatient Medications   Medication Instructions    amLODIPine (NORVASC) 10 mg, Oral, Daily    cholecalciferol (vitamin D3) 1,250 mcg, Oral, Every 7 days    DULoxetine (CYMBALTA) 30 mg, Oral, Daily    hydrOXYzine pamoate (VISTARIL) 25 mg, Oral, Every 8 hours PRN    lisinopriL (PRINIVIL,ZESTRIL) 20 mg, Oral, Daily    nicotine (NICODERM CQ) 14 mg/24 hr 1 patch, Transdermal, Daily    [START ON 2024] nicotine (NICODERM CQ) 7 mg/24 hr 1 patch, Transdermal, Daily    pantoprazole (PROTONIX) 40 mg, Oral, Daily        Review of patient's allergies indicates:  No Known Allergies    Objective:     Visit Vitals  /82 (BP Location: Right arm, Patient Position: Sitting)   Pulse 87   Temp 98.7 °F (37.1 °C) (Oral)   Ht 5' 9" (1.753 m)   Wt (!) 186 kg (410 lb)   LMP 2024   SpO2 95%   BMI 60.55 kg/m²       Physical Examination:     Physical Exam  Constitutional:       General: She is not in acute distress.  Eyes:      General: No scleral icterus.     Extraocular Movements: Extraocular movements intact.      Conjunctiva/sclera: Conjunctivae normal.      Pupils: Pupils are equal, round, and reactive to light.   Cardiovascular:      Rate and Rhythm: Normal rate and regular rhythm.      Heart sounds: No murmur heard.  Pulmonary:      Effort: Pulmonary effort is normal. No respiratory distress.      Breath sounds: No stridor. No wheezing or rhonchi.   Abdominal:      Palpations: Abdomen is soft.   Musculoskeletal:         General: No swelling. Normal range of motion.   Skin:     General: Skin is warm and dry.      Coloration: Skin is not " jaundiced.      Findings: No rash.   Neurological:      Mental Status: She is alert.      Gait: Gait normal.   Psychiatric:         Thought Content: Thought content normal.       Lab Results:     Chemistry:  Lab Results   Component Value Date     05/29/2024    K 4.2 05/29/2024    BUN 12.0 05/29/2024    CREATININE 0.92 05/29/2024    EGFRNORACEVR >60 05/29/2024    GLUCOSE 89 05/29/2024    CALCIUM 9.1 05/29/2024    ALKPHOS 69 05/29/2024    LABPROT 7.6 05/29/2024    ALBUMIN 3.3 (L) 05/29/2024    BILIDIR 0.1 05/09/2021    IBILI 0.10 05/09/2021    AST 19 05/29/2024    ALT 30 05/29/2024    MG 1.95 05/09/2021    [id number] 14 (L) 05/29/2024    TSH 1.113 05/29/2024    RRUTEB2VYCU 0.88 05/29/2024        Lab Results   Component Value Date    HGBA1C 5.2 05/29/2024        Hematology:  Lab Results   Component Value Date    WBC 9.31 05/29/2024    HGB 12.9 05/29/2024    HCT 39.3 05/29/2024     05/29/2024       Lipid Panel:  Lab Results   Component Value Date    CHOL 155 05/29/2024    HDL 40 05/29/2024    .00 05/29/2024    TRIG 76 05/29/2024    TOTALCHOLEST 4 05/29/2024        Urine:  Lab Results   Component Value Date    APPEARANCEUA Clear 05/29/2024    SGUA 1.013 05/29/2024    PROTEINUA Negative 05/29/2024    KETONESUA Negative 05/29/2024    LEUKOCYTESUR Negative 05/29/2024    RBCUA 0-5 05/29/2024    WBCUA 0-5 05/29/2024    BACTERIA None Seen 05/29/2024    SQEPUA Trace (A) 05/29/2024    HYALINECASTS None Seen 05/29/2024        Assessment:          ICD-10-CM ICD-9-CM   1. Encounter for immunization  Z23 V03.89   2. Anxiety with depression  F41.8 300.4   3. Hypertension, unspecified type  I10 401.9   4. Class 3 severe obesity due to excess calories with serious comorbidity and body mass index (BMI) of 60.0 to 69.9 in adult  E66.01 278.01    Z68.44 V85.44   5. Smoker  F17.200 305.1        Plan:     1. Encounter for immunization  -     pneumoc 20-maximilian conj-dip cr(PF) (PREVNAR-20 (PF)) injection Syrg 0.5 mL    2.  Anxiety with depression  Assessment & Plan:  Anxiety and depression is improved continuing Cymbalta 30 mg daily and hydroxyzine p.r.n.      3. Hypertension, unspecified type  Assessment & Plan:  Blood pressure is controlled 121/82 continuing amlodipine and lisinopril      4. Class 3 severe obesity due to excess calories with serious comorbidity and body mass index (BMI) of 60.0 to 69.9 in adult  Assessment & Plan:  Patient has lost 4 lb since past visit congratulated patient.    Encouraged continued healthy eating and exercise  Once patient is 6 weeks smoke free can send referral for bariatric surgery      5. Smoker  Assessment & Plan:  Patient on 1 week smoke free  Continue nicotine patches as needed       30 minutes spent on patient encounter history and physical, plan of care.       Follow up in about 1 month (around 7/26/2024) for anxiety.    Future Appointments   Date Time Provider Department Center   7/29/2024  9:20 AM Alicia Delong MD [facility]        Alicia Delong MD

## 2024-07-29 ENCOUNTER — OFFICE VISIT (OUTPATIENT)
Dept: FAMILY MEDICINE | Facility: CLINIC | Age: 39
End: 2024-07-29
Payer: MEDICAID

## 2024-07-29 VITALS
OXYGEN SATURATION: 99 % | HEART RATE: 81 BPM | WEIGHT: 293 LBS | SYSTOLIC BLOOD PRESSURE: 132 MMHG | DIASTOLIC BLOOD PRESSURE: 84 MMHG | BODY MASS INDEX: 43.4 KG/M2 | TEMPERATURE: 99 F | HEIGHT: 69 IN

## 2024-07-29 DIAGNOSIS — J02.9 SORE THROAT: ICD-10-CM

## 2024-07-29 DIAGNOSIS — E66.01 CLASS 3 SEVERE OBESITY DUE TO EXCESS CALORIES WITH SERIOUS COMORBIDITY AND BODY MASS INDEX (BMI) OF 60.0 TO 69.9 IN ADULT: Primary | ICD-10-CM

## 2024-07-29 DIAGNOSIS — I10 HYPERTENSION, UNSPECIFIED TYPE: ICD-10-CM

## 2024-07-29 DIAGNOSIS — J30.9 ALLERGIC RHINITIS, UNSPECIFIED SEASONALITY, UNSPECIFIED TRIGGER: ICD-10-CM

## 2024-07-29 DIAGNOSIS — F41.8 ANXIETY WITH DEPRESSION: ICD-10-CM

## 2024-07-29 PROBLEM — F17.200 SMOKER: Status: RESOLVED | Noted: 2024-05-29 | Resolved: 2024-07-29

## 2024-07-29 LAB
CTP QC/QA: YES
CTP QC/QA: YES
MOLECULAR STREP A: NEGATIVE
SARS-COV-2 RDRP RESP QL NAA+PROBE: NEGATIVE

## 2024-07-29 PROCEDURE — 99214 OFFICE O/P EST MOD 30 MIN: CPT | Mod: S$PBB,,, | Performed by: STUDENT IN AN ORGANIZED HEALTH CARE EDUCATION/TRAINING PROGRAM

## 2024-07-29 PROCEDURE — 87651 STREP A DNA AMP PROBE: CPT | Mod: PBBFAC,PN | Performed by: STUDENT IN AN ORGANIZED HEALTH CARE EDUCATION/TRAINING PROGRAM

## 2024-07-29 PROCEDURE — 4010F ACE/ARB THERAPY RXD/TAKEN: CPT | Mod: CPTII,,, | Performed by: STUDENT IN AN ORGANIZED HEALTH CARE EDUCATION/TRAINING PROGRAM

## 2024-07-29 PROCEDURE — 87635 SARS-COV-2 COVID-19 AMP PRB: CPT | Mod: PBBFAC,PN | Performed by: STUDENT IN AN ORGANIZED HEALTH CARE EDUCATION/TRAINING PROGRAM

## 2024-07-29 PROCEDURE — 3008F BODY MASS INDEX DOCD: CPT | Mod: CPTII,,, | Performed by: STUDENT IN AN ORGANIZED HEALTH CARE EDUCATION/TRAINING PROGRAM

## 2024-07-29 PROCEDURE — 3075F SYST BP GE 130 - 139MM HG: CPT | Mod: CPTII,,, | Performed by: STUDENT IN AN ORGANIZED HEALTH CARE EDUCATION/TRAINING PROGRAM

## 2024-07-29 PROCEDURE — 3079F DIAST BP 80-89 MM HG: CPT | Mod: CPTII,,, | Performed by: STUDENT IN AN ORGANIZED HEALTH CARE EDUCATION/TRAINING PROGRAM

## 2024-07-29 PROCEDURE — 99213 OFFICE O/P EST LOW 20 MIN: CPT | Mod: PBBFAC,PN | Performed by: STUDENT IN AN ORGANIZED HEALTH CARE EDUCATION/TRAINING PROGRAM

## 2024-07-29 PROCEDURE — 3044F HG A1C LEVEL LT 7.0%: CPT | Mod: CPTII,,, | Performed by: STUDENT IN AN ORGANIZED HEALTH CARE EDUCATION/TRAINING PROGRAM

## 2024-07-29 PROCEDURE — 1159F MED LIST DOCD IN RCRD: CPT | Mod: CPTII,,, | Performed by: STUDENT IN AN ORGANIZED HEALTH CARE EDUCATION/TRAINING PROGRAM

## 2024-07-29 RX ORDER — DULOXETIN HYDROCHLORIDE 60 MG/1
60 CAPSULE, DELAYED RELEASE ORAL DAILY
Qty: 30 CAPSULE | Refills: 11 | Status: SHIPPED | OUTPATIENT
Start: 2024-07-29 | End: 2025-07-29

## 2024-07-29 RX ORDER — FLUTICASONE PROPIONATE 50 MCG
1 SPRAY, SUSPENSION (ML) NASAL DAILY
Qty: 18.2 ML | Refills: 11 | Status: SHIPPED | OUTPATIENT
Start: 2024-07-29

## 2024-07-29 NOTE — PROGRESS NOTES
I tried calling this patient because she does not have the portal to let her know that her strep and COVID were both negative.  She did not answer.  Can you please try calling her and letting her know the above and that I phoned in some Flonase and Claritin for her to take?  She can also try honey with lemon juice to help her sore throat.

## 2024-07-29 NOTE — PROGRESS NOTES
Patient Name: Isa Reyes     : 1985    MRN: [id number]     Subjective:     Patient ID: Isa Reyes is a 38 y.o. female.    Chief Complaint:   Chief Complaint   Patient presents with    Follow-up     Patient here for follow up. Reports some discomfort in her throat that started yesterday. Bp 132/84        HPI: HPI  38-year-old female presents to clinic to clinic for lab results, Follow up of depression with anxiety, HTN and obesity    Hypertension  Blood pressure in clinic today 132/84 Patient is compliant with amlodipine 10 mg daily and lisinopril 20 mg daily  No issues or complaints    Smoking  Patient is now 6 weeks smoke free States that she finds she does not miss smoking     Anxiety with depression  Last visit patient was stopped on Celexa and started on Cymbalta 30 mg daily states that she finds this dose to be very helpful for but would like to increase as she finds it is not completely relieving anxiety No SI or HI   Also takes hydroxyzine  Pamoate 25 mg q.8 hours p.r.n. anxiety reports only having to take the medication at night    Obesity with body mass index of 60  Patient would like referral to bariatric surgery      Patient also states that she has a sore throat x1 day states that son was sick last week but she bought medication for him which relieved his sore throat amenable to testing for COVID and strep denies fever or chills or cough    Chronic issues not discussed      Patient has concerns as she has a family history of heart disease in her grandparents both of them passed away due to heart attacks in their 60s        Also with some concerns over GERD states it has been a longstanding issue  She is tried to find foods that cause the GERD symptoms but has never had scoped is amenable to a trial of PPI  ROS:      ROS     12 point review of systems conducted, negative except as stated in the history of present illness. See HPI for details.    History:     History reviewed. No  "pertinent past medical history.     Past Surgical History:   Procedure Laterality Date    CHOLECYSTECTOMY      ECTOPIC PREGNANCY SURGERY         No family history on file.     Social History     Tobacco Use    Smoking status: Former     Current packs/day: 0.00     Types: Cigarettes     Quit date: 2024     Years since quittin.1    Smokeless tobacco: Never   Substance and Sexual Activity    Alcohol use: Yes    Drug use: Never    Sexual activity: Yes       Current Outpatient Medications   Medication Instructions    amLODIPine (NORVASC) 10 mg, Oral, Daily    cholecalciferol (vitamin D3) 1,250 mcg, Oral, Every 7 days    DULoxetine (CYMBALTA) 60 mg, Oral, Daily    fluticasone propionate (FLONASE) 50 mcg, Each Nostril, Daily    hydrOXYzine pamoate (VISTARIL) 25 mg, Oral, Every 8 hours PRN    lisinopriL (PRINIVIL,ZESTRIL) 20 mg, Oral, Daily    pantoprazole (PROTONIX) 40 mg, Oral, Daily        Review of patient's allergies indicates:  No Known Allergies    Objective:     Visit Vitals  /84 (BP Location: Left arm, Patient Position: Sitting)   Pulse 81   Temp 98.7 °F (37.1 °C) (Oral)   Ht 5' 9" (1.753 m)   Wt (!) 186.9 kg (412 lb)   LMP 2024 (Exact Date)   SpO2 99%   BMI 60.84 kg/m²       Physical Examination:     Physical Exam  Constitutional:       General: She is not in acute distress.     Appearance: Normal appearance. She is not ill-appearing or diaphoretic.   HENT:      Nose: No congestion.      Mouth/Throat:      Mouth: Mucous membranes are moist.      Pharynx: Posterior oropharyngeal erythema present. No oropharyngeal exudate.   Eyes:      Conjunctiva/sclera: Conjunctivae normal.      Pupils: Pupils are equal, round, and reactive to light.   Cardiovascular:      Rate and Rhythm: Normal rate and regular rhythm.      Heart sounds: No murmur heard.  Pulmonary:      Effort: Pulmonary effort is normal. No respiratory distress.      Breath sounds: Normal breath sounds. No wheezing.   Musculoskeletal:    "      General: No swelling, tenderness, deformity or signs of injury. Normal range of motion.      Cervical back: Normal range of motion.   Skin:     General: Skin is warm and dry.      Coloration: Skin is not jaundiced.   Neurological:      General: No focal deficit present.      Mental Status: She is alert and oriented to person, place, and time. Mental status is at baseline.      Gait: Gait normal.         Lab Results:     Chemistry:  Lab Results   Component Value Date     05/29/2024    K 4.2 05/29/2024    BUN 12.0 05/29/2024    CREATININE 0.92 05/29/2024    EGFRNORACEVR >60 05/29/2024    GLUCOSE 89 05/29/2024    CALCIUM 9.1 05/29/2024    ALKPHOS 69 05/29/2024    LABPROT 7.6 05/29/2024    ALBUMIN 3.3 (L) 05/29/2024    BILIDIR 0.1 05/09/2021    IBILI 0.10 05/09/2021    AST 19 05/29/2024    ALT 30 05/29/2024    MG 1.95 05/09/2021    ZPNOIWVS89LJ 14 (L) 05/29/2024    TSH 1.113 05/29/2024    EXSGZD3CKRV 0.88 05/29/2024        Lab Results   Component Value Date    HGBA1C 5.2 05/29/2024        Hematology:  Lab Results   Component Value Date    WBC 9.31 05/29/2024    HGB 12.9 05/29/2024    HCT 39.3 05/29/2024     05/29/2024       Lipid Panel:  Lab Results   Component Value Date    CHOL 155 05/29/2024    HDL 40 05/29/2024    .00 05/29/2024    TRIG 76 05/29/2024    TOTALCHOLEST 4 05/29/2024        Urine:  Lab Results   Component Value Date    APPEARANCEUA Clear 05/29/2024    SGUA 1.013 05/29/2024    PROTEINUA Negative 05/29/2024    KETONESUA Negative 05/29/2024    LEUKOCYTESUR Negative 05/29/2024    RBCUA 0-5 05/29/2024    WBCUA 0-5 05/29/2024    BACTERIA None Seen 05/29/2024    SQEPUA Trace (A) 05/29/2024    HYALINECASTS None Seen 05/29/2024        Assessment:          ICD-10-CM ICD-9-CM   1. Class 3 severe obesity due to excess calories with serious comorbidity and body mass index (BMI) of 60.0 to 69.9 in adult  E66.01 278.01    Z68.44 V85.44   2. Sore throat  J02.9 462   3. Anxiety with depression   F41.8 300.4   4. Allergic rhinitis, unspecified seasonality, unspecified trigger  J30.9 477.9   5. Hypertension, unspecified type  I10 401.9        Plan:     1. Class 3 severe obesity due to excess calories with serious comorbidity and body mass index (BMI) of 60.0 to 69.9 in adult  Assessment & Plan:  Patient is no longer a smoker and has been 6 weeks smoke free referring patient for bariatric surgery referral    Orders:  -     Ambulatory referral/consult to Bariatric Surgery; Future; Expected date: 08/05/2024    2. Sore throat  -     POCT COVID-19 Rapid Screening  -     POCT Strep A, Molecular    3. Anxiety with depression  Assessment & Plan:  Improved but increasing Cymbalta to 60 mg continuing Vistaril 25 mg q.8 hours p.r.n.    Orders:  -     DULoxetine (CYMBALTA) 60 MG capsule; Take 1 capsule (60 mg total) by mouth once daily.  Dispense: 30 capsule; Refill: 11    4. Allergic rhinitis, unspecified seasonality, unspecified trigger  -     fluticasone propionate (FLONASE) 50 mcg/actuation nasal spray; 1 spray (50 mcg total) by Each Nostril route once daily.  Dispense: 18.2 mL; Refill: 11    5. Hypertension, unspecified type  Assessment & Plan:  Hypertension is controlled at 132/84 continuing lisinopril 20 mg daily and amlodipine 10 mg daily           Follow up in about 3 months (around 10/29/2024) for Hypertension, anxiety.    Future Appointments   Date Time Provider Department Center   9/11/2024  9:40 AM Alicia Delong MD [facility]        Alicia Delong MD

## 2024-07-29 NOTE — ASSESSMENT & PLAN NOTE
Patient is no longer a smoker and has been 6 weeks smoke free referring patient for bariatric surgery referral

## 2024-09-02 PROBLEM — Z00.00 WELLNESS EXAMINATION: Status: RESOLVED | Noted: 2024-05-29 | Resolved: 2024-09-02

## 2024-09-10 ENCOUNTER — TELEPHONE (OUTPATIENT)
Dept: FAMILY MEDICINE | Facility: CLINIC | Age: 39
End: 2024-09-10
Payer: MEDICAID

## 2024-09-10 NOTE — TELEPHONE ENCOUNTER
----- Message from Kanwal Lincoln sent at 9/10/2024 11:22 AM CDT -----  .Type:  Patient Returning Call    Who Called:PT  Who Left Message for Patient:PT  Does the patient know [facility] [place]  Would the patient rather a call back or a response via [employer]?   Best Call Back Number:[phone]  Additional Information: Please note in chart to only send medication to [facility] [place]

## 2024-09-10 NOTE — TELEPHONE ENCOUNTER
Called patient.  confirmed. I let the patient know that I was returning her call. Stated that appointment for tomorrow has been rescheduled. I then asked if there was anything else that I could help with. States that she wanted to change pharmacies. I informed patient that I changed the pharmacy in her chart, all she needs to do is call the pharmacy that she is wanting to change to and let them know that she wants to get all her scripts transferred. She will do so.    [Sneezing] : sneezing [Seasonal Allergies] : seasonal allergies [Ear Pain] : ear pain [Dizziness] : dizziness [Ear Itch] : ear itch [Vertigo] : vertigo [Ear Noises] : ear noises [Lightheadedness] : lightheadedness [Recurrent Sinus Infections] : recurrent sinus infections [Easy Bleeding] : tendency for easy bleeding [Negative] : Endocrine [FreeTextEntry1] : headache, passing out

## 2024-09-24 ENCOUNTER — TELEPHONE (OUTPATIENT)
Dept: BARIATRICS | Facility: HOSPITAL | Age: 39
End: 2024-09-24
Payer: MEDICAID

## 2024-10-15 NOTE — PROGRESS NOTES
"Initial Consult  Isa Reyes  Chief Complaint   Patient presents with    Consult     Interested in [facility]     History of Present Illness:  Patient is a 39 y.o. female who is referred for evaluation of surgical treatment of morbid obesity. Her Body mass index is 59.28 kg/m². She has known comorbidities of GERD and hypertension. She has attended the bariatric seminar and is most interested in gastric sleeve surgery. The patient has had multiple unsuccessful diet attempts in the past without sustained weight loss. With multiple unsuccessful weight loss attempts, the patient believes they are ready for surgical intervention.     Denies any complaints or issues today for visit    After high school began to put on weight and depo shots, had baby at 19. Lost 3 children.     Labs (5/29/24): vitamin D: 14, A1c: 5.2%, all other labs WNLs    Menstrual cycle: monthly, regular   Children: 1 child, 19 son.   Occupation: no    Anemia/easy brusing/bleeding - [] Yes   [x] No   Smoker - [] Yes   [x] No  [] Resolved  Hx of blood clots - [] Yes   [x] No     Ht: 5' 11" (1.803 m) 69in  Weights:   Trend Weights BMI   Starting 425# 59   Pre-Op     2 Week     2 Month     6 Month      1 Year     2 Year               For Adults 20 Years and Older:  BMI Weight Status   Below 18.5 Underweight   18.6-24.9 Normal/Healthy   25.0-29.9 Overweight   30.0 & Above Obese     Ideal Weight Range for Your Height: 5'9" = 128 - 168 lbs     Pertinent History:  HTN - [x] Yes   [] No    [] Resolved  HLD - [] Yes   [x] No    [] Resolved  DM  -  [] Yes   [x] No    [] Resolved  YUNIER - [] Yes   [x] No   [] Resolved  GERD - [x] Yes   [] No [] Resolved  Anticoagulation- [] Yes   [x] No      If yes, type: [] ASA [] Plavix [] Other    Patient Care Team:  Alicia Delong MD as PCP - General (Family Medicine)  Mark Schmitt MD as Consulting Physician (Bariatrics)     Subjective:     12 point review of systems conducted, negative except as stated in the " "history of present illness. See HPI for details.    Review of patient's allergies indicates:  No Known Allergies  Past Medical History:   Diagnosis Date    Anxiety disorder, unspecified     Depression     GERD (gastroesophageal reflux disease)     Hypertension     Morbid obesity      Past Surgical History:   Procedure Laterality Date     SECTION      CHOLECYSTECTOMY      ECTOPIC PREGNANCY SURGERY      FRACTURE SURGERY       Family History   Problem Relation Name Age of Onset    Cancer Mother Sommer desouza     Heart disease Mother Sommer desouza     Heart disease Maternal Grandmother      Heart disease Maternal Grandfather José cuetoeneaux     Heart disease Paternal Grandmother Sommer CuetoLyly     Heart disease Paternal Grandfather José Desouza      Social History     Tobacco Use    Smoking status: Former     Current packs/day: 0.00     Types: Cigarettes     Quit date: 2024     Years since quittin.3    Smokeless tobacco: Never   Substance Use Topics    Alcohol use: Yes     Alcohol/week: 2.0 standard drinks of alcohol     Types: 2 Glasses of wine per week    Drug use: Never      Current Outpatient Medications   Medication Instructions    amLODIPine (NORVASC) 10 mg, Oral, Daily    cholecalciferol (vitamin D3) 1,250 mcg, Oral, Every 7 days    DULoxetine (CYMBALTA) 60 mg, Oral, Daily    fluticasone propionate (FLONASE) 50 mcg, Each Nostril, Daily    hydrOXYzine pamoate (VISTARIL) 25 mg, Oral, Every 8 hours PRN    lisinopriL (PRINIVIL,ZESTRIL) 20 mg, Oral, Daily    pantoprazole (PROTONIX) 40 mg, Oral, Daily       Objective:     Vital Signs (Most Recent):  Visit Vitals  BP (!) 149/105   Pulse 99   Ht 5' 11" (1.803 m)   Wt (!) 192.8 kg (425 lb)   BMI 59.28 kg/m²       Physical Exam:  General:  Alert and oriented. Obese  Respiratory:  Lungs are clear to auscultation, Respirations are non-labored, Breath sounds are equal.    Cardiovascular:  Normal rate, Regular rhythm, No murmur.  "   Gastrointestinal:  Soft, Non-tender, Non-distended, Normal bowel sounds        Musculoskeletal:  Normal range of motion, Normal strength.    Integumentary:  Warm, Dry, Pink.    Neurologic:  Alert, Oriented.    Psychiatric:  Cooperative.      ASSESSMENT/PLAN:        1. Encounter for weight loss counseling        2. Dietary counseling        3. Exercise counseling        4. Encounter for pre-bariatric surgery counseling and education        5. S/P gastric sleeve procedure        6. Hypertension, unspecified type        7. Hyperlipidemia, unspecified hyperlipidemia type        8. Class 3 severe obesity due to excess calories with serious comorbidity and body mass index (BMI) of 60.0 to 69.9 in adult  Ambulatory referral/consult to Bariatric Surgery          Plan:  Isa Reyes is morbidly obese (Body mass index is 59.28 kg/m².) with significant weight related co-morbidity including: GERD and hypertension. The patient has been unable to lose weight and improve their co-morbid conditions with medical management including diet and exercise. She understands that this is a tool and lifestyle change will be necessary to keep weight off.     RECOMMENDATION: Weight loss surgery. The risks, benefits, and alternatives, including gastric sleeve surgery (which the patient prefers) were discussed at length and all of their questions were answered. The patient appears to understand and wishes to proceed.     Risk of pregnancy discussed with pt's of child bearing age. Avoid pregnancy up to 18 months after bariatric procedure to maximize weight loss and avoid subsequent vitamin and mineral deficiencies and/or possible miscarriage due to low caloric intake.     The patient was given the following instructions:  H.Pylori breat test  Once H.Pylori back will need EGD set up  3 months of medically supervised weight loss visits with the dietitian/, BMI requirement of 50  will need clearance from [facility]. Hx of HTN  f/u with PCP  regarding blood pressure   Needs psychological evaluations and clearance.  Must attend a preoperative bariatric class.  Discussion with pt regarding steroids and NSAIDs post surgery. Will not be able to use these after surgery due to risk of ulceration, perforation, esophagitis, gastritis, bleeding, etc. Pt verbalized understanding.   The patient clearly understood that surgery would only be scheduled if there were no medical or psychiatric contraindications and a second office visit is required.

## 2024-10-17 ENCOUNTER — CLINICAL SUPPORT (OUTPATIENT)
Dept: BARIATRICS | Facility: HOSPITAL | Age: 39
End: 2024-10-17

## 2024-10-17 ENCOUNTER — OFFICE VISIT (OUTPATIENT)
Dept: SURGERY | Facility: CLINIC | Age: 39
End: 2024-10-17
Payer: MEDICAID

## 2024-10-17 VITALS — WEIGHT: 293 LBS | HEIGHT: 69 IN | BODY MASS INDEX: 43.4 KG/M2

## 2024-10-17 VITALS
BODY MASS INDEX: 41.02 KG/M2 | DIASTOLIC BLOOD PRESSURE: 105 MMHG | SYSTOLIC BLOOD PRESSURE: 149 MMHG | HEART RATE: 99 BPM | HEIGHT: 71 IN | WEIGHT: 293 LBS

## 2024-10-17 DIAGNOSIS — Z71.82 EXERCISE COUNSELING: ICD-10-CM

## 2024-10-17 DIAGNOSIS — E66.813 CLASS 3 SEVERE OBESITY DUE TO EXCESS CALORIES WITH SERIOUS COMORBIDITY AND BODY MASS INDEX (BMI) OF 60.0 TO 69.9 IN ADULT: ICD-10-CM

## 2024-10-17 DIAGNOSIS — Z71.3 DIETARY COUNSELING: ICD-10-CM

## 2024-10-17 DIAGNOSIS — E78.5 HYPERLIPIDEMIA, UNSPECIFIED HYPERLIPIDEMIA TYPE: ICD-10-CM

## 2024-10-17 DIAGNOSIS — E66.01 CLASS 3 SEVERE OBESITY DUE TO EXCESS CALORIES WITH SERIOUS COMORBIDITY AND BODY MASS INDEX (BMI) OF 60.0 TO 69.9 IN ADULT: ICD-10-CM

## 2024-10-17 DIAGNOSIS — Z90.3 S/P GASTRIC SLEEVE PROCEDURE: ICD-10-CM

## 2024-10-17 DIAGNOSIS — E66.813 CLASS 3 SEVERE OBESITY DUE TO EXCESS CALORIES WITH SERIOUS COMORBIDITY AND BODY MASS INDEX (BMI) OF 60.0 TO 69.9 IN ADULT: Primary | ICD-10-CM

## 2024-10-17 DIAGNOSIS — E66.9 OBESITY: Primary | ICD-10-CM

## 2024-10-17 DIAGNOSIS — I10 HYPERTENSION, UNSPECIFIED TYPE: ICD-10-CM

## 2024-10-17 DIAGNOSIS — Z71.3 ENCOUNTER FOR WEIGHT LOSS COUNSELING: Primary | ICD-10-CM

## 2024-10-17 DIAGNOSIS — E66.01 CLASS 3 SEVERE OBESITY DUE TO EXCESS CALORIES WITH SERIOUS COMORBIDITY AND BODY MASS INDEX (BMI) OF 60.0 TO 69.9 IN ADULT: Primary | ICD-10-CM

## 2024-10-17 DIAGNOSIS — Z71.89 ENCOUNTER FOR PRE-BARIATRIC SURGERY COUNSELING AND EDUCATION: ICD-10-CM

## 2024-10-17 NOTE — PROGRESS NOTES
"NUTRITIONAL CONSULT    Initial assessment for sleeve gastrectomy  Non Surgical: []    PAST HISTORY:   Dieting attempts include with diet and exercise   Highest adult weight: 425#  How many years at present wt: recently   Greatest single wt loss: #35    CLINICAL DATA:  Height:   Ht Readings from Last 1 Encounters:   10/17/24 5' 9" (1.753 m)      Weight:   Wt Readings from Last 3 Encounters:   10/17/24 1131 (!) 192.4 kg (424 lb 3.2 oz)   10/17/24 1026 (!) 192.8 kg (425 lb)   24 0935 (!) 186.9 kg (412 lb)      IBW: 145 lbs   BMI:   BMI Readings from Last 1 Encounters:   10/17/24 62.64 kg/m²      Bariatric goal weight (125% EBW): 181 lbs  Patient's goal weight: 250 lbs    FAMILY HISTORY OF OBESITY:  Yes           WHAT SIDE OF THE FAMILY?   []Mother   []Father   [x]Sibling   []Child     [x]Extended    []Adopted       Goal for Bariatric Surgery: to improve health, to improve quality of life, to lose weight, and to prevent future medical conditions    NUTRITION & HEALTH HISTORY:  Greatest challenge: starchy CHO, portion control, snacking at night, and irregular meal patterns    Current diet recall:   B:  2 eggs and 2 celestin  toast - water   L:   snacks on whatever is on hand there is no prepping   Snacking on chips   D: rice meat and gravy     Current Dietary Patterns:  Meal pattern: 3  Snackin / day Type:   Vegetables: Likes a variety. Eats almost daily.  Fruits: Likes a variety. Eats 2-3 times per week.  Beverages: water and sugar-free beverages  Alcohol consumption: Monthly. Type: social   Dining out: Monthly. Mostly restaurants.  Grocery shopping and food prep: Yes[x]Self   []Spouse   []Other:   Emotional eating: Yes Which emotions if yes:stress   Nighttime snacking: Yes Middle of night: No Before bedtime: Yes  Hx of disordered eating behaviors: No Anorexia: No Bulimia: No Purging: No Binging:No  History of vitamin/mineral deficiencies:   No    Vitamins / Minerals / Herbs:   None     Food Allergies:   None "       ASSESSMENT:  Patient reports attempts at weight loss, only to regain lost weight.  Patient demonstrated knowledge of healthy eating behaviors and exercise patterns; admits to not eating healthy and not exercising at this point.  Patient states willingness to change lifestyle and make behavior modifications.  Expect good  compliance after surgery at this time.        ESTIMATED NEEDS:  Calories: 1798-1636 (20-25 kcal/kg adjusted BW/d)  Protein: 82-90.5 (1.0-1.1 g/kg adjusted BW/d)  Fluid:  1640 (20 mL/kg actual BW/d)    BARIATRIC DIET DISCUSSION:  Discussed diet after surgery and related to patients food record.  Reviewed diet progression before and after surgery.  Reinforced that surgery is not a magic bullet and importance of low fat foods and no snacking.  Answered all questions.    RECOMMENDATIONS:  Patient is a good candidate for bariatric surgery.      PLAN:follow mod pre op diet for 40-50# weight loss   Limit starches   Increase protein intake   Limit diet soda

## 2024-10-17 NOTE — PROGRESS NOTES
BEHAVIOR MODIFICATION AND EXERCISE CONSULT    PERSONAL:     What initiated your interest in bariatric surgery?   TIRED OF LOSING AND GAINING WEIGHT    Marital Status: [x]Single   []   []   []      Do you have children? 1     Do you have childcare issues?    What is your highest level of education completed? ASSOCIATES    Who is your social or relational support? SON, PARENTS    Do you work? []Yes   [x]No   []Disabled   []Retired    If yes, what is your occupation?    Do you enjoy your work? YES    Were there any particular events that lead to significant weight gain? []Loss of a loved one  []Pregnancy  []Trauma, accident, illness  []Loss of employment [x]Other    PHYSICAL ACTIVITY:    Do you currently exercise: []Yes   [x]No    If so, please describe:    Have you experienced any injuries and/or restrictions that may limit your physical activity? []Yes   [x]No    If so, please describe:     BEHAVIORS:    What behavior(s) would you like to change in order to be healthy? EATING BEHAVIORS, EXERCISE MORE (JOG)    On a scale of 1-10 (1-extremely low, 10-extremely high), how motivated are you to change your behavior(s)?     Do you currently use any type of tobacco products (vape, dip, cigarettes, etc.) No    If yes, on average, how many and/or how often do you use these products on a daily basis?    How many hours of sleep do you average? 7    Rate your stress level on a scale of 1-10 (1-extremely low, 10-extremely high) 5    What is your biggest life stressor? WORRY    How do you cope and/or manage stress? SIT OUTSIDE AND BE AT [place], BREATHING EXERCISES    Is your appetite affected by stress, boredom, depression, etc.? YES    Have you ever seen a counselor or therapist? NO      CURRENT WEIGHT: 424.2 HIGHEST WEIGHT: 424.2 LOWEST ADULT WEIGHT: 250 GOAL WEIGHT: 250    WAIST/HIP: 61/67.5    HANDOUTS: PREOP BOOKLET. PATIENT INSTRUCTED TO READ PG. 18    NOTES: BODY COMPOSITION WAS CONDUCTED AND PATIENT  WAS EDUCATED ON RESULTS.      MELINDA Wu, CPT, CHC

## 2024-10-25 ENCOUNTER — TELEPHONE (OUTPATIENT)
Dept: SURGERY | Facility: CLINIC | Age: 39
End: 2024-10-25
Payer: MEDICAID

## 2024-10-25 DIAGNOSIS — I10 HYPERTENSION, UNSPECIFIED TYPE: Primary | ICD-10-CM

## 2024-10-25 NOTE — TELEPHONE ENCOUNTER
----- Message from Tahira sent at 10/23/2024  4:00 PM CDT -----  Regarding: Referral  Please send referral to PCP for htn mgmt.

## 2024-10-30 ENCOUNTER — TELEPHONE (OUTPATIENT)
Dept: SURGERY | Facility: CLINIC | Age: 39
End: 2024-10-30
Payer: MEDICAID

## 2024-11-04 ENCOUNTER — CLINICAL SUPPORT (OUTPATIENT)
Dept: BARIATRICS | Facility: HOSPITAL | Age: 39
End: 2024-11-04

## 2024-11-04 VITALS — WEIGHT: 293 LBS | BODY MASS INDEX: 62.42 KG/M2

## 2024-11-04 DIAGNOSIS — E66.813 CLASS 3 SEVERE OBESITY DUE TO EXCESS CALORIES WITH SERIOUS COMORBIDITY AND BODY MASS INDEX (BMI) OF 60.0 TO 69.9 IN ADULT: Primary | ICD-10-CM

## 2024-11-04 DIAGNOSIS — E66.01 CLASS 3 SEVERE OBESITY DUE TO EXCESS CALORIES WITH SERIOUS COMORBIDITY AND BODY MASS INDEX (BMI) OF 60.0 TO 69.9 IN ADULT: Primary | ICD-10-CM

## 2024-11-04 PROCEDURE — 94200034 HC BARIATRIC NUTRITIONAL SVCS PT GRADE I

## 2024-11-04 NOTE — PROGRESS NOTES
"Patient Education [x] [facility] Visit Number: 1/3     []  Pre-op Wt Loss Goal (as ordered on referral):   [] [facility] Visit:     Height:   Ht Readings from Last 1 Encounters:   10/17/24 5' 9" (1.753 m)      Weight:   Wt Readings from Last 3 Encounters:   11/04/24 1513 (!) 191.7 kg (422 lb 11.2 oz)   10/17/24 1131 (!) 192.4 kg (424 lb 3.2 oz)   10/17/24 1026 (!) 192.8 kg (425 lb)      BMI:   BMI Readings from Last 1 Encounters:   11/04/24 62.42 kg/m²                                                                        Barriers to learning:  [x]None evident  []Acuity of illness  []Cognitive defects  []Cultural barriers  []Desire/Motivation  []Difficulty concentrating  []Emotional state  []Financial concerns  []Hearing deficit  []Language barrier  []Literacy  []Memory problems  []Vision impairment     Home caregiver present for session   []YES  [x] NO     Teaching methods:  [x]Demonstration  [x]Explanation  [x]Printed materials  []Teach back  []Virtual/web based     Verbalizes understanding Demonstrates  Needs further teaching Needs practice/ supervision Comments    Bariatric Surgery Diet  [] [] [] []    Clear liquid [] [] [] []    Full liquid [] [] [] []    Weight Reduction [x] [] [] []    Other Diet [] [] [] []      Expected Compliance:  [x]Good  []Fair  []Poor    Additional Information:    Pt presents for 1/3 [facility] visit. She realizes her main issue is her larger portions of starches like bread and pasta. Since her consult she has cut back on bread and pasta, does not eat after 8 pm, and snacks on proteins like beef jerky and cashews. Discussed increasing protein, aiming for 2,000 tamiko/day, prepping ahead dinner for the week, and starting to walk 4 days/week.    24 hr recall:  Coffee: with creamer with sugar, and adds splenda  Breakfast: egg muffins with sausage and blueberry greek chobani yogurt  Lunch: salad with caesar  Dinner: sweet potatoes brocooli and chicken   80 oz     Plan:   2000 tamiko/day - use meal guide  Prep " dinner ahead of time for week  Walk 45-60 min 4 days/week  Practice measuring out starches - 1/2 cup serving size    ESTIMATED NEEDS:  Calories: 8414-3906 (20-25 kcal/kg adjusted BW/d)  Protein: 82-90.5         (1.0-1.1 g/kg adjusted BW/d)  Fluid:  1640    (20 mL/kg actual BW/d)

## 2024-11-19 ENCOUNTER — TELEPHONE (OUTPATIENT)
Dept: FAMILY MEDICINE | Facility: CLINIC | Age: 39
End: 2024-11-19
Payer: MEDICAID

## 2024-11-19 NOTE — TELEPHONE ENCOUNTER
Returned phone call to this they were checking on the status for this patients referral. They sent a referral for the patient to be seen for her blood pressure. She is already a patient here and she had an appt scheduled for 11/14/2024 but the patient no showed this appt. Providers office notified.

## 2024-11-19 NOTE — TELEPHONE ENCOUNTER
----- Message from Trevon Greg sent at 11/19/2024 10:56 AM CST -----  Regarding: advice  Who Called:  Dr. Cotter and Dr. Schmitt General Surgery office     Caller is requesting assistance/information from provider's office.    Symptoms (please be specific):    How long has patient had these symptoms:    List of preferred pharmacies on file (remove unneeded): [unfilled]  If different, enter pharmacy into here including location and phone number:       Preferred Method of Contact: Phone Call  Patient's Preferred Phone Number on File: [phone]   Best Call Back Number, if different:    Additional Information: Dr Cotter and Dr. Schmitt  general surgery office calling on an update on a referral they sent to office. Please call back at [phone]

## 2024-11-27 ENCOUNTER — TELEPHONE (OUTPATIENT)
Dept: SURGERY | Facility: CLINIC | Age: 39
End: 2024-11-27
Payer: MEDICAID

## 2024-11-27 NOTE — TELEPHONE ENCOUNTER
----- Message from Ramon Zaragoza sent at 11/27/2024  1:40 PM CST -----  Regarding: FW: Fu referral  Fyi she had no showed to her appt and does not look like she completed any requirements at this time  ----- Message -----  From: Mila Cain MA  Sent: 11/5/2024  12:00 AM CST  To: Yen Steve Staff  Subject: RE: Fu referral                                  Not scheduled at this time  ----- Message -----  From: Mila Cain MA  Sent: 10/30/2024  12:00 AM CDT  To: Yen Steve Staff  Subject: Fu referral                                      Fu on status of internal referral sent to PCP for hypertension management

## 2024-12-17 ENCOUNTER — CLINICAL SUPPORT (OUTPATIENT)
Dept: BARIATRICS | Facility: HOSPITAL | Age: 39
End: 2024-12-17

## 2024-12-17 VITALS — WEIGHT: 293 LBS | BODY MASS INDEX: 62.95 KG/M2

## 2024-12-17 DIAGNOSIS — E66.01 CLASS 3 SEVERE OBESITY DUE TO EXCESS CALORIES WITH SERIOUS COMORBIDITY AND BODY MASS INDEX (BMI) OF 60.0 TO 69.9 IN ADULT: Primary | ICD-10-CM

## 2024-12-17 DIAGNOSIS — E66.813 CLASS 3 SEVERE OBESITY DUE TO EXCESS CALORIES WITH SERIOUS COMORBIDITY AND BODY MASS INDEX (BMI) OF 60.0 TO 69.9 IN ADULT: Primary | ICD-10-CM

## 2024-12-17 PROCEDURE — 94200034 HC BARIATRIC NUTRITIONAL SVCS PT GRADE I

## 2024-12-17 NOTE — PROGRESS NOTES
"Patient Education [x] [facility] Visit Number: 2/3     []  Pre-op Wt Loss Goal (as ordered on referral):  [] [facility] Visit:     Height:   Ht Readings from Last 1 Encounters:   10/17/24 5' 9" (1.753 m)      Weight:   Wt Readings from Last 3 Encounters:   12/17/24 1541 (!) 193.4 kg (426 lb 4.8 oz)   11/04/24 1513 (!) 191.7 kg (422 lb 11.2 oz)   10/17/24 1131 (!) 192.4 kg (424 lb 3.2 oz)      BMI:   BMI Readings from Last 1 Encounters:   12/17/24 62.95 kg/m²                                                                        Barriers to learning:  [x]None evident  []Acuity of illness  []Cognitive defects  []Cultural barriers  []Desire/Motivation  []Difficulty concentrating  []Emotional state  []Financial concerns  []Hearing deficit  []Language barrier  []Literacy  []Memory problems  []Vision impairment     Home caregiver present for session   []YES  [x] NO     Teaching methods:  [x]Demonstration  [x]Explanation  []Printed materials  []Teach back  []Virtual/web based     Verbalizes understanding Demonstrates  Needs further teaching Needs practice/ supervision Comments    Bariatric Surgery Diet  [] [] [] []    Clear liquid [] [] [] []    Full liquid [] [] [] []    Weight Reduction [x] [] [] []    Other Diet [] [] [] []      Expected Compliance:  [x]Good  []Fair  []Poor    Additional Information:    Pt has gained 4#s since last month's [facility] visit. She admits to being busy this month with the holidays and moving houses. Her main goal is to start prepping meals ahead and increase activity. Also suggested [employer] meals or [employer] for dinner if she is struggling to make time to cook. Pt understood and seem more motivated this month to increase activity and eat healthier.    Plan:  2000 tamiko/day - use meal guide  Prep dinner ahead of time for week  Walk 45-60 min 4 days/week  Practice measuring out starches - 1/2 cup serving size     ESTIMATED NEEDS:  Calories: 0308-3574 (20-25 kcal/kg adjusted BW/d)  Protein: 82-90.5         " (1.0-1.1 g/kg adjusted BW/d)  Fluid:  1640    (20 mL/kg actual BW/d)

## 2025-01-14 ENCOUNTER — CLINICAL SUPPORT (OUTPATIENT)
Dept: BARIATRICS | Facility: HOSPITAL | Age: 40
End: 2025-01-14

## 2025-01-14 VITALS — WEIGHT: 293 LBS | BODY MASS INDEX: 62.73 KG/M2

## 2025-01-14 DIAGNOSIS — E66.01 CLASS 3 SEVERE OBESITY DUE TO EXCESS CALORIES WITH SERIOUS COMORBIDITY AND BODY MASS INDEX (BMI) OF 60.0 TO 69.9 IN ADULT: Primary | ICD-10-CM

## 2025-01-14 DIAGNOSIS — E66.813 CLASS 3 SEVERE OBESITY DUE TO EXCESS CALORIES WITH SERIOUS COMORBIDITY AND BODY MASS INDEX (BMI) OF 60.0 TO 69.9 IN ADULT: Primary | ICD-10-CM

## 2025-01-14 PROCEDURE — 94200034 HC BARIATRIC NUTRITIONAL SVCS PT GRADE I

## 2025-01-14 NOTE — PROGRESS NOTES
"Patient Education [x] [facility] Visit Number: 3/3     []  Pre-op Wt Loss Goal (as ordered on referral):   [] [facility] Visit:     Height:   Ht Readings from Last 1 Encounters:   10/17/24 5' 9" (1.753 m)      Weight:   Wt Readings from Last 3 Encounters:   01/14/25 1532 (!) 192.7 kg (424 lb 12.8 oz)   12/17/24 1541 (!) 193.4 kg (426 lb 4.8 oz)   11/04/24 1513 (!) 191.7 kg (422 lb 11.2 oz)      BMI:   BMI Readings from Last 1 Encounters:   01/14/25 62.73 kg/m²                                                                        Barriers to learning:  [x]None evident  []Acuity of illness  []Cognitive defects  []Cultural barriers  []Desire/Motivation  []Difficulty concentrating  []Emotional state  []Financial concerns  []Hearing deficit  []Language barrier  []Literacy  []Memory problems  []Vision impairment     Home caregiver present for session   []YES  [x] NO     Teaching methods:  [x]Demonstration  [x]Explanation  []Printed materials  []Teach back  [x]Virtual/web based     Verbalizes understanding Demonstrates  Needs further teaching Needs practice/ supervision Comments    Bariatric Surgery Diet  [] [] [] []    Clear liquid [] [] [] []    Full liquid [] [] [] []    Weight Reduction [x] [] [] []    Other Diet [] [] [] []      Expected Compliance:  [x]Good  []Fair  []Poor    Additional Information:    Pt presents with a 2# weight loss since last month's session. She admits to eating more over holidays but the past two weeks she has been making healthier decisions when eating and she has been walking 1-2 miles daily. Her main goal this month is to prep meals ahead and create a normal eating routine.     Plan:  Continue current exercise regimen walking 1-2 miles daily  Prep dinner ahead of time for week- check out [email or web address] for recipes  Eat 3 meals daily with a lean protein source- do not skip meals  Try [employer] if interested  "

## 2025-02-26 ENCOUNTER — OFFICE VISIT (OUTPATIENT)
Dept: FAMILY MEDICINE | Facility: CLINIC | Age: 40
End: 2025-02-26
Payer: MEDICAID

## 2025-02-26 ENCOUNTER — RESULTS FOLLOW-UP (OUTPATIENT)
Dept: FAMILY MEDICINE | Facility: CLINIC | Age: 40
End: 2025-02-26

## 2025-02-26 VITALS
TEMPERATURE: 98 F | OXYGEN SATURATION: 97 % | BODY MASS INDEX: 63.94 KG/M2 | DIASTOLIC BLOOD PRESSURE: 98 MMHG | HEART RATE: 96 BPM | SYSTOLIC BLOOD PRESSURE: 151 MMHG | WEIGHT: 293 LBS

## 2025-02-26 DIAGNOSIS — E55.9 VITAMIN D DEFICIENCY: ICD-10-CM

## 2025-02-26 DIAGNOSIS — K21.9 GASTROESOPHAGEAL REFLUX DISEASE, UNSPECIFIED WHETHER ESOPHAGITIS PRESENT: ICD-10-CM

## 2025-02-26 DIAGNOSIS — F41.8 ANXIETY WITH DEPRESSION: ICD-10-CM

## 2025-02-26 DIAGNOSIS — I10 PRIMARY HYPERTENSION: Primary | ICD-10-CM

## 2025-02-26 LAB
25(OH)D3+25(OH)D2 SERPL-MCNC: 24 NG/ML (ref 30–80)
ALBUMIN SERPL-MCNC: 3.2 G/DL (ref 3.5–5)
ALBUMIN/GLOB SERPL: 0.7 RATIO (ref 1.1–2)
ALP SERPL-CCNC: 74 UNIT/L (ref 40–150)
ALT SERPL-CCNC: 29 UNIT/L (ref 0–55)
ANION GAP SERPL CALC-SCNC: 5 MEQ/L
AST SERPL-CCNC: 17 UNIT/L (ref 5–34)
BASOPHILS # BLD AUTO: 0.04 X10(3)/MCL
BASOPHILS NFR BLD AUTO: 0.6 %
BILIRUB SERPL-MCNC: 0.3 MG/DL
BUN SERPL-MCNC: 10.1 MG/DL (ref 7–18.7)
CALCIUM SERPL-MCNC: 8.7 MG/DL (ref 8.4–10.2)
CHLORIDE SERPL-SCNC: 109 MMOL/L (ref 98–107)
CO2 SERPL-SCNC: 25 MMOL/L (ref 22–29)
CREAT SERPL-MCNC: 0.83 MG/DL (ref 0.55–1.02)
CREAT/UREA NIT SERPL: 12
EOSINOPHIL # BLD AUTO: 0.26 X10(3)/MCL (ref 0–0.9)
EOSINOPHIL NFR BLD AUTO: 3.8 %
ERYTHROCYTE [DISTWIDTH] IN BLOOD BY AUTOMATED COUNT: 13.5 % (ref 11.5–17)
GFR SERPLBLD CREATININE-BSD FMLA CKD-EPI: >60 ML/MIN/1.73/M2
GLOBULIN SER-MCNC: 4.7 GM/DL (ref 2.4–3.5)
GLUCOSE SERPL-MCNC: 98 MG/DL (ref 74–100)
HCT VFR BLD AUTO: 37.8 % (ref 37–47)
HGB BLD-MCNC: 12.1 G/DL (ref 12–16)
IMM GRANULOCYTES # BLD AUTO: 0.01 X10(3)/MCL (ref 0–0.04)
IMM GRANULOCYTES NFR BLD AUTO: 0.1 %
LYMPHOCYTES # BLD AUTO: 1.59 X10(3)/MCL (ref 0.6–4.6)
LYMPHOCYTES NFR BLD AUTO: 23.4 %
MCH RBC QN AUTO: 28 PG (ref 27–31)
MCHC RBC AUTO-ENTMCNC: 32 G/DL (ref 33–36)
MCV RBC AUTO: 87.5 FL (ref 80–94)
MONOCYTES # BLD AUTO: 0.4 X10(3)/MCL (ref 0.1–1.3)
MONOCYTES NFR BLD AUTO: 5.9 %
NEUTROPHILS # BLD AUTO: 4.5 X10(3)/MCL (ref 2.1–9.2)
NEUTROPHILS NFR BLD AUTO: 66.2 %
NRBC BLD AUTO-RTO: 0 %
PLATELET # BLD AUTO: 286 X10(3)/MCL (ref 130–400)
PMV BLD AUTO: 9.9 FL (ref 7.4–10.4)
POTASSIUM SERPL-SCNC: 4.2 MMOL/L (ref 3.5–5.1)
PROT SERPL-MCNC: 7.9 GM/DL (ref 6.4–8.3)
RBC # BLD AUTO: 4.32 X10(6)/MCL (ref 4.2–5.4)
SODIUM SERPL-SCNC: 139 MMOL/L (ref 136–145)
WBC # BLD AUTO: 6.8 X10(3)/MCL (ref 4.5–11.5)

## 2025-02-26 PROCEDURE — 99214 OFFICE O/P EST MOD 30 MIN: CPT | Mod: S$PBB,,,

## 2025-02-26 PROCEDURE — 1160F RVW MEDS BY RX/DR IN RCRD: CPT | Mod: CPTII,,,

## 2025-02-26 PROCEDURE — 3075F SYST BP GE 130 - 139MM HG: CPT | Mod: CPTII,,,

## 2025-02-26 PROCEDURE — 85025 COMPLETE CBC W/AUTO DIFF WBC: CPT

## 2025-02-26 PROCEDURE — 3008F BODY MASS INDEX DOCD: CPT | Mod: CPTII,,,

## 2025-02-26 PROCEDURE — 36415 COLL VENOUS BLD VENIPUNCTURE: CPT

## 2025-02-26 PROCEDURE — 99214 OFFICE O/P EST MOD 30 MIN: CPT | Mod: PBBFAC,PN

## 2025-02-26 PROCEDURE — 1159F MED LIST DOCD IN RCRD: CPT | Mod: CPTII,,,

## 2025-02-26 PROCEDURE — 80053 COMPREHEN METABOLIC PANEL: CPT

## 2025-02-26 PROCEDURE — 3080F DIAST BP >= 90 MM HG: CPT | Mod: CPTII,,,

## 2025-02-26 PROCEDURE — 4010F ACE/ARB THERAPY RXD/TAKEN: CPT | Mod: CPTII,,,

## 2025-02-26 PROCEDURE — 82306 VITAMIN D 25 HYDROXY: CPT

## 2025-02-26 RX ORDER — FAMOTIDINE 40 MG/1
40 TABLET, FILM COATED ORAL DAILY
Qty: 30 TABLET | Refills: 11 | Status: SHIPPED | OUTPATIENT
Start: 2025-02-26 | End: 2026-02-26

## 2025-02-26 RX ORDER — HYDROCHLOROTHIAZIDE 12.5 MG/1
12.5 TABLET ORAL DAILY
Qty: 30 TABLET | Refills: 11 | Status: SHIPPED | OUTPATIENT
Start: 2025-02-26 | End: 2026-02-26

## 2025-02-26 RX ORDER — BUPROPION HYDROCHLORIDE 150 MG/1
150 TABLET ORAL DAILY
Qty: 30 TABLET | Refills: 11 | Status: SHIPPED | OUTPATIENT
Start: 2025-02-26 | End: 2026-02-26

## 2025-02-26 RX ORDER — VIT C/E/ZN/COPPR/LUTEIN/ZEAXAN 250MG-90MG
3000 CAPSULE ORAL DAILY
Qty: 90 CAPSULE | Refills: 6 | Status: SHIPPED | OUTPATIENT
Start: 2025-02-26

## 2025-02-26 NOTE — PROGRESS NOTES
[facility]  Ely Patel MD    Patient ID: [id number]     Chief Complaint: Follow-up (Patient here for follow up. Reports that her body is retaining a lot of fluid. She has noticed this for the past few months. Also complains of a scratchy feeling in her throat. )      HPI:     Isa Reyes is a 39 y.o. female here today for follow-up of hypertension, anxiety and depression.    Hypertension  136/96 in clinic today.  Compliant with lisinopril. She has not been compliant with amlodipine. She reports some intermittent leg swelling recently. Improves with elevation.  She has an appointment with Cardiologist, Dr. Yates today. She does have episodes of chest tightness associated with anxiety. No shortness of breath.     Anxiety and depression  Patient is currently on Cymbalta and hydroxyzine. She does not feel like the Cymbalta has been helping. She lost a loved one recently.     Vitamin-D Deficiency  Vitamin-D level of 14 in May.  She just completed D3 53853 units weekly for 8 weeks     GERD   She is still having daily reflux with throat burning on Protonix     Seen by Dr. Barlow as a consult for weight loss surgery.  They are planning bariatric surgery in the near future.    Past Medical History:   Diagnosis Date    Anxiety disorder, unspecified     Depression     GERD (gastroesophageal reflux disease)     Hypertension     Morbid obesity         Past Surgical History:   Procedure Laterality Date     SECTION      CHOLECYSTECTOMY      ECTOPIC PREGNANCY SURGERY      FRACTURE SURGERY          Social History     Tobacco Use    Smoking status: Former     Current packs/day: 0.00     Types: Cigarettes     Quit date: 2024     Years since quittin.6    Smokeless tobacco: Never   Substance and Sexual Activity    Alcohol use: Yes     Alcohol/week: 2.0 standard drinks of alcohol     Types: 2 Glasses of wine per week    Drug use: Never    Sexual activity: Yes     Partners: Male      Birth control/protection: None     Comment: Ryan been together for years        Current Outpatient Medications   Medication Instructions    buPROPion (WELLBUTRIN XL) 150 mg, Oral, Daily    cholecalciferol (vitamin D3) 1,250 mcg, Oral, Every 7 days    DULoxetine (CYMBALTA) 60 mg, Oral, Daily    famotidine (PEPCID) 40 mg, Oral, Daily    fluticasone propionate (FLONASE) 50 mcg, Each Nostril, Daily    hydroCHLOROthiazide 12.5 mg, Oral, Daily    hydrOXYzine pamoate (VISTARIL) 25 mg, Oral, Every 8 hours PRN    lisinopriL (PRINIVIL,ZESTRIL) 20 mg, Oral, Daily    pantoprazole (PROTONIX) 40 mg, Oral, Daily       Review of patient's allergies indicates:  No Known Allergies     Patient Care Team:  Alicia Delong MD as PCP - General (Family Medicine)  Mark Schmitt MD as Consulting Physician (Bariatrics)     Subjective:     Review of Systems    12 point review of systems conducted, negative except as stated in the history of present illness. See HPI for details.    Objective:     Visit Vitals  BP (!) 136/96 (BP Location: Right arm, Patient Position: Sitting)   Pulse (!) 114   Temp 97.8 °F (36.6 °C) (Oral)   Wt (!) 196.4 kg (433 lb)   LMP 02/24/2025   SpO2 97%   BMI 63.94 kg/m²       Physical Exam    Labs Reviewed:     Chemistry:  Lab Results   Component Value Date     05/29/2024    K 4.2 05/29/2024    BUN 12.0 05/29/2024    CREATININE 0.92 05/29/2024    EGFRNORACEVR >60 05/29/2024    GLUCOSE 89 05/29/2024    CALCIUM 9.1 05/29/2024    ALKPHOS 69 05/29/2024    LABPROT 7.6 05/29/2024    ALBUMIN 3.3 (L) 05/29/2024    BILIDIR 0.1 05/09/2021    IBILI 0.10 05/09/2021    AST 19 05/29/2024    ALT 30 05/29/2024    MG 1.95 05/09/2021    UMPFBMDQ36XQ 14 (L) 05/29/2024    TSH 1.113 05/29/2024    YOTNGN8VAXI 0.88 05/29/2024        Lab Results   Component Value Date    HGBA1C 5.2 05/29/2024        Hematology:  Lab Results   Component Value Date    WBC 9.31 05/29/2024    HGB 12.9 05/29/2024    HCT 39.3 05/29/2024      05/29/2024       Lipid Panel:  Lab Results   Component Value Date    CHOL 155 05/29/2024    HDL 40 05/29/2024    .00 05/29/2024    TRIG 76 05/29/2024    TOTALCHOLEST 4 05/29/2024        Urine:  Lab Results   Component Value Date    APPEARANCEUA Clear 05/29/2024    SGUA 1.013 05/29/2024    PROTEINUA Negative 05/29/2024    KETONESUA Negative 05/29/2024    LEUKOCYTESUR Negative 05/29/2024    RBCUA 0-5 05/29/2024    WBCUA 0-5 05/29/2024    BACTERIA None Seen 05/29/2024    SQEPUA Trace (A) 05/29/2024    HYALINECASTS None Seen 05/29/2024        Assessment:       ICD-10-CM ICD-9-CM   1. Primary hypertension  I10 401.9   2. Anxiety with depression  F41.8 300.4   3. Vitamin D deficiency  E55.9 268.9   4. Gastroesophageal reflux disease, unspecified whether esophagitis present  K21.9 530.81        Plan:     1. Primary hypertension  Assessment & Plan:  Blood pressure mildly elevated in clinic and at home. Will add HCTZ due to her report of intermittent dependent edema. Continue lisinopril. Discontinue amlodipine as this can worsen swelling and patient has not been compliant with it. Keep scheduled initial appointment with cardiology today.         AHA/ACC goal <130/80. JNC8 goal <140/90 (all ages if DM or CKD OR <60), <150/90 (>60 w/o CKD or DM)  Low Sodium Diet (DASH Diet - Less than 2 grams of sodium per day).  Monitor blood pressure daily and log. Report consistent numbers greater than 140/90.  Maintain healthy weight with goal BMI <30. Exercise 30 minutes per day, 5 days per week.         Orders:  -     CBC Auto Differential  -     Comprehensive Metabolic Panel    2. Anxiety with depression  Assessment & Plan:  No improvement with Cymbalta. Patient elected to try Wellbutrin after discussion of options. Continue hydroxyzine PRN for anxiety. Follow-up in one month.       3. Vitamin D deficiency  Assessment & Plan:  Completed treatment with D3 50,000 units weekly for 8 weeks. Repeat level today    Orders:  -      Vitamin D    4. Gastroesophageal reflux disease, unspecified whether esophagitis present  Assessment & Plan:  Symptoms uncontrolled with Protonix 40mg daily. Will send to GI for EGD. In the meantime, will add Pepcid. Educated on GERD diet.     Orders:  -     Ambulatory referral/consult to Gastroenterology    Other orders  -     famotidine (PEPCID) 40 MG tablet; Take 1 tablet (40 mg total) by mouth once daily.  Dispense: 30 tablet; Refill: 11  -     hydroCHLOROthiazide 12.5 MG Tab; Take 1 tablet (12.5 mg total) by mouth once daily.  Dispense: 30 tablet; Refill: 11  -     buPROPion (WELLBUTRIN XL) 150 MG TB24 tablet; Take 1 tablet (150 mg total) by mouth once daily.  Dispense: 30 tablet; Refill: 11         Follow up in about 1 month (around 3/26/2025). In addition to their scheduled follow up, the patient has also been instructed to follow up on as needed basis.     Future Appointments   Date Time Provider Department Center   2/28/2025 11:20 AM Joellen Nichols RD [facility] Amadou Patel MD

## 2025-02-26 NOTE — ASSESSMENT & PLAN NOTE
Blood pressure mildly elevated in clinic and at home. Will add HCTZ due to her report of intermittent dependent edema. Continue lisinopril. Discontinue amlodipine as this can worsen swelling and patient has not been compliant with it. Keep scheduled initial appointment with cardiology today.         AHA/ACC goal <130/80. JNC8 goal <140/90 (all ages if DM or CKD OR <60), <150/90 (>60 w/o CKD or DM)  Low Sodium Diet (DASH Diet - Less than 2 grams of sodium per day).  Monitor blood pressure daily and log. Report consistent numbers greater than 140/90.  Maintain healthy weight with goal BMI <30. Exercise 30 minutes per day, 5 days per week.

## 2025-02-26 NOTE — ASSESSMENT & PLAN NOTE
Symptoms uncontrolled with Protonix 40mg daily. Will send to GI for EGD. In the meantime, will add Pepcid. Educated on GERD diet.

## 2025-02-26 NOTE — ASSESSMENT & PLAN NOTE
No improvement with Cymbalta. Patient elected to try Wellbutrin after discussion of options. Continue hydroxyzine PRN for anxiety. Follow-up in one month.

## 2025-02-28 ENCOUNTER — CLINICAL SUPPORT (OUTPATIENT)
Dept: BARIATRICS | Facility: HOSPITAL | Age: 40
End: 2025-02-28

## 2025-02-28 VITALS — WEIGHT: 293 LBS | BODY MASS INDEX: 62.47 KG/M2

## 2025-02-28 DIAGNOSIS — E66.01 CLASS 3 SEVERE OBESITY DUE TO EXCESS CALORIES WITH SERIOUS COMORBIDITY AND BODY MASS INDEX (BMI) OF 60.0 TO 69.9 IN ADULT: Primary | ICD-10-CM

## 2025-02-28 DIAGNOSIS — E66.813 CLASS 3 SEVERE OBESITY DUE TO EXCESS CALORIES WITH SERIOUS COMORBIDITY AND BODY MASS INDEX (BMI) OF 60.0 TO 69.9 IN ADULT: Primary | ICD-10-CM

## 2025-02-28 PROCEDURE — 94200034 HC BARIATRIC NUTRITIONAL SVCS PT GRADE I

## 2025-02-28 NOTE — PROGRESS NOTES
"Patient Education [] [facility] Visit Number:      [x]  Pre-op Wt Loss Goal (as ordered on referral): BMI goal 50  [] [facility] Visit:     Height:   Ht Readings from Last 1 Encounters:   10/17/24 5' 9" (1.753 m)      Weight:   Wt Readings from Last 3 Encounters:   02/28/25 1127 (!) 191.9 kg (423 lb)   02/26/25 0838 (!) 196.4 kg (433 lb)   01/14/25 1532 (!) 192.7 kg (424 lb 12.8 oz)      BMI:   BMI Readings from Last 1 Encounters:   02/28/25 62.47 kg/m²                                                                        Barriers to learning:  []None evident  []Acuity of illness  []Cognitive defects  []Cultural barriers  []Desire/Motivation  []Difficulty concentrating  [x]Emotional state  []Financial concerns  []Hearing deficit  []Language barrier  []Literacy  []Memory problems  []Vision impairment     Home caregiver present for session   []YES  [x] NO     Teaching methods:  []Demonstration  [x]Explanation  []Printed materials  []Teach back  []Virtual/web based     Verbalizes understanding Demonstrates  Needs further teaching Needs practice/ supervision Comments    Bariatric Surgery Diet  [] [] [] []    Clear liquid [] [] [] []    Full liquid [] [] [] []    Weight Reduction [x] [] [] []    Other Diet [] [] [] []      Expected Compliance:  []Good  [x]Fair  []Poor    Additional Information:    Pt presents with a 1# weight loss since last month's visit. She reports she has gotten off track due to emotional stress past month due to unexpected death of her father. She did see her cardiologist who put her on fluid pill to help with edema issue. Pt said her main goals this month are to meal prep dinner and to start walking a few days/week.     Plan:  Start walking 3 days week for 1 hour   Meal prep dinner - use options on [employer] low carb if desired  Eat 3 meals daily with a lean protein source- do not skip meals  "

## 2025-03-27 ENCOUNTER — CLINICAL SUPPORT (OUTPATIENT)
Dept: BARIATRICS | Facility: HOSPITAL | Age: 40
End: 2025-03-27

## 2025-03-27 VITALS — WEIGHT: 293 LBS | BODY MASS INDEX: 62.07 KG/M2

## 2025-03-27 DIAGNOSIS — E66.813 CLASS 3 SEVERE OBESITY DUE TO EXCESS CALORIES WITH SERIOUS COMORBIDITY AND BODY MASS INDEX (BMI) OF 60.0 TO 69.9 IN ADULT: Primary | ICD-10-CM

## 2025-03-27 DIAGNOSIS — E66.01 CLASS 3 SEVERE OBESITY DUE TO EXCESS CALORIES WITH SERIOUS COMORBIDITY AND BODY MASS INDEX (BMI) OF 60.0 TO 69.9 IN ADULT: Primary | ICD-10-CM

## 2025-03-27 PROCEDURE — 94200034 HC BARIATRIC NUTRITIONAL SVCS PT GRADE I

## 2025-03-27 NOTE — PROGRESS NOTES
"Patient Education [] [facility] Visit Number:      [x]  Pre-op Wt Loss Goal (as ordered on referral): BMI goal 50  [] [facility] Visit:     Height:   Ht Readings from Last 1 Encounters:   10/17/24 5' 9" (1.753 m)      Weight:   Wt Readings from Last 3 Encounters:   03/27/25 1527 (!) 190.6 kg (420 lb 4.8 oz)   02/28/25 1127 (!) 191.9 kg (423 lb)   02/26/25 0838 (!) 196.4 kg (433 lb)      BMI:   BMI Readings from Last 1 Encounters:   03/27/25 62.07 kg/m²                                                                        Barriers to learning:  [x]None evident  []Acuity of illness  []Cognitive defects  []Cultural barriers  []Desire/Motivation  []Difficulty concentrating  []Emotional state  []Financial concerns  []Hearing deficit  []Language barrier  []Literacy  []Memory problems  []Vision impairment     Home caregiver present for session   []YES  [x] NO     Teaching methods:  [x]Demonstration  [x]Explanation  []Printed materials  []Teach back  []Virtual/web based     Verbalizes understanding Demonstrates  Needs further teaching Needs practice/ supervision Comments    Bariatric Surgery Diet  [] [] [] []    Clear liquid [] [] [] []    Full liquid [] [] [] []    Weight Reduction [x] [] [] []    Other Diet [] [] [] []      Expected Compliance:  [x]Good  []Fair  []Poor    Additional Information:    Pt presents with a 3# weight loss. Says she has incorporated walking 3 days weekly for 1 hour each time, meal prepping lunch and dinner throughout the week, snacking on veggies and fruits or proteins, and drinking water only. Pt is not measuring out starches and sometimes having multiple during day (ex: grits for breakfast, sweet potatoes lunch, pasta at dinner). Discussed choosing starches to have at one meal only each day and to measure with a 1/2 cup serving size. Pt understood. Said would talk to PCP this month about potentially starting weight loss medication to help reach her weight loss goals.    24 hr recall:  Breakfast: protein " shake   Lunch: Grilled chicken and broccoli and Sweet potatoes   Dinner: cucumbers and tomatoes   Snacks : turkey slices and carrots and fruits (grapes and strawberries)  Drinks water only    Plan:  Talk to PCP about weight loss medication  Increase walking to 4 days weekly for 1 hour each time  Limit starches to one meal daily and measure starches with 1/2 cup serving size  Continue meal prepping lunch and dinner and not skipping meals

## 2025-04-30 ENCOUNTER — PATIENT MESSAGE (OUTPATIENT)
Dept: BARIATRICS | Facility: HOSPITAL | Age: 40
End: 2025-04-30

## 2025-04-30 ENCOUNTER — CLINICAL SUPPORT (OUTPATIENT)
Dept: BARIATRICS | Facility: HOSPITAL | Age: 40
End: 2025-04-30

## 2025-04-30 VITALS — BODY MASS INDEX: 62.95 KG/M2 | WEIGHT: 293 LBS

## 2025-04-30 DIAGNOSIS — E66.813 CLASS 3 SEVERE OBESITY DUE TO EXCESS CALORIES WITH SERIOUS COMORBIDITY AND BODY MASS INDEX (BMI) OF 60.0 TO 69.9 IN ADULT: Primary | ICD-10-CM

## 2025-04-30 DIAGNOSIS — E66.01 CLASS 3 SEVERE OBESITY DUE TO EXCESS CALORIES WITH SERIOUS COMORBIDITY AND BODY MASS INDEX (BMI) OF 60.0 TO 69.9 IN ADULT: Primary | ICD-10-CM

## 2025-04-30 PROCEDURE — 94200034 HC BARIATRIC NUTRITIONAL SVCS PT GRADE I

## 2025-04-30 NOTE — PROGRESS NOTES
"Patient Education [] [facility] Visit Number:      [x]  Pre-op Wt Loss Goal (as ordered on referral): BMI goal 50  [] [facility] Visit:      Height:   Ht Readings from Last 1 Encounters:   10/17/24 5' 9" (1.753 m)      Weight:   Wt Readings from Last 3 Encounters:   04/30/25 1256 (!) 193.4 kg (426 lb 4.8 oz)   03/27/25 1527 (!) 190.6 kg (420 lb 4.8 oz)   02/28/25 1127 (!) 191.9 kg (423 lb)      BMI:   BMI Readings from Last 1 Encounters:   04/30/25 62.95 kg/m²                                                                        Barriers to learning:  []None evident  []Acuity of illness  []Cognitive defects  []Cultural barriers  []Desire/Motivation  []Difficulty concentrating  [x]Emotional state  []Financial concerns  []Hearing deficit  []Language barrier  []Literacy  []Memory problems  []Vision impairment     Home caregiver present for session   []YES  [x] NO     Teaching methods:  [x]Demonstration  [x]Explanation  []Printed materials  [x]Teach back  []Virtual/web based     Verbalizes understanding Demonstrates  Needs further teaching Needs practice/ supervision Comments    Bariatric Surgery Diet  [] [] [] []    Clear liquid [] [] [] []    Full liquid [] [] [] []    Weight Reduction [x] [] [] []    Other Diet [] [] [] []      Expected Compliance:  [x]Good  []Fair  []Poor    Additional Information:    Pt presents with a 6# weight gain from last month's visit. She reports she injured her heel a couple weeks ago and has not been walking or doing as much activity due to this. She has still had consistent eating habits: 3 meals daily with lean source of protein, limiting starch to only 1/2 cup daily, drinking sugar free beverages only, and including veggies at lunch and dinner. Pt did claim she drank a few margaritas over the weekend for son's birthday which could have contributed to weight gain- this is not a normal habit for her. Discussed better and low sugar options. Also pt said she would increase activity this month by " cycling at gym. Will reach out to PCP about potentially starting weight loss medication (has an appointment for June).    Plan:  Talk to PCP about weight loss medication- or reach out to suggested clinics  Go to gym with son and cycle/row to prevent pressure to heel  Continue meal prepping lunch and dinner and not skipping meals  Continue drinking over 100 oz water daily

## 2025-05-29 ENCOUNTER — CLINICAL SUPPORT (OUTPATIENT)
Dept: BARIATRICS | Facility: HOSPITAL | Age: 40
End: 2025-05-29

## 2025-05-29 VITALS — BODY MASS INDEX: 62.47 KG/M2 | WEIGHT: 293 LBS

## 2025-05-29 DIAGNOSIS — E66.01 CLASS 3 SEVERE OBESITY DUE TO EXCESS CALORIES WITH SERIOUS COMORBIDITY AND BODY MASS INDEX (BMI) OF 60.0 TO 69.9 IN ADULT: Primary | ICD-10-CM

## 2025-05-29 DIAGNOSIS — E66.813 CLASS 3 SEVERE OBESITY DUE TO EXCESS CALORIES WITH SERIOUS COMORBIDITY AND BODY MASS INDEX (BMI) OF 60.0 TO 69.9 IN ADULT: Primary | ICD-10-CM

## 2025-05-29 PROCEDURE — 94200034 HC BARIATRIC NUTRITIONAL SVCS PT GRADE I

## 2025-05-29 NOTE — PROGRESS NOTES
"Patient Education [] [facility] Visit Number:      [x]  Pre-op Wt Loss Goal (as ordered on referral): BMI goal 50   [] [facility] Visit:     Height:   Ht Readings from Last 1 Encounters:   10/17/24 5' 9" (1.753 m)      Weight:   Wt Readings from Last 3 Encounters:   05/29/25 1459 (!) 191.9 kg (423 lb)   04/30/25 1256 (!) 193.4 kg (426 lb 4.8 oz)   03/27/25 1527 (!) 190.6 kg (420 lb 4.8 oz)      BMI:   BMI Readings from Last 1 Encounters:   05/29/25 62.47 kg/m²                                                                        Barriers to learning:  [x]None evident  []Acuity of illness  []Cognitive defects  []Cultural barriers  []Desire/Motivation  []Difficulty concentrating  []Emotional state  []Financial concerns  []Hearing deficit  []Language barrier  []Literacy  []Memory problems  []Vision impairment     Home caregiver present for session   []YES  [x] NO     Teaching methods:  [x]Demonstration  [x]Explanation  []Printed materials  []Teach back  []Virtual/web based     Verbalizes understanding Demonstrates  Needs further teaching Needs practice/ supervision Comments    Bariatric Surgery Diet  [] [] [] []    Clear liquid [] [] [] []    Full liquid [] [] [] []    Weight Reduction [x] [] [] []    Other Diet [] [] [] []      Expected Compliance:  [x]Good  []Fair  []Poor    Additional Information:    Pt presents with a 3# weight loss. Says she struggled last month with stress due to family situations and time which led to skipping meals and lack of preparation of meals. Pt is motivated to lose 12#s this month by prepping all meals ahead of time since her activity is limited with her hurt foot.     Plan: 12#s down by next month's visit  Use modified pre-op diet to help plan meals for the month  Don't skip meals- eat 3 meals daily with a lean source of protein  Call clinic about potentially starting weight loss medication  Continue drinking over 100 oz water daily  "

## 2025-06-09 DIAGNOSIS — I10 ESSENTIAL (PRIMARY) HYPERTENSION: ICD-10-CM

## 2025-06-09 RX ORDER — LISINOPRIL 20 MG/1
20 TABLET ORAL DAILY
Qty: 90 TABLET | Refills: 0 | Status: SHIPPED | OUTPATIENT
Start: 2025-06-09 | End: 2025-09-07

## 2025-06-09 NOTE — TELEPHONE ENCOUNTER
Copied from CRM #[id number]. Topic: Medications - Medication Refill  >> Jun 9, 2025 10:40 AM Jazzy wrote:  Who Called: Isa Reyes    Refill or New Rx:Refill    RX Name and Strength:lisinopriL (PRINIVIL,ZESTRIL) 20 MG tablet    How is the patient currently taking it? (ex. 1XDay): n/a    Is this a 30 day or 90 day RX: n/a    Local or Mail Order: local    List of preferred pharmacies on file (remove unneeded): [facility]/pharmacy #[id number] - [place], [place] - [address]  [facility]  Phone: [phone]  Fax: [phone]    Ordering Provider: AMILCAR WILSON    Preferred Method of Contact: Phone Call    Patient's Preferred Phone Number on File: [phone]     Best Call Back Number, if different: n/a    Additional Information: pt ran out of the above RX. I scheduled an appt for her this Friday, please fill the RX to get her through.    Please advise if possible to fill.

## 2025-06-27 ENCOUNTER — TELEPHONE (OUTPATIENT)
Dept: SURGERY | Facility: CLINIC | Age: 40
End: 2025-06-27

## 2025-06-27 NOTE — LETTER
[facility]  [address] [place] [address], [address]  [place]ar  [id number]  [phone]      06/27/2025       Dear Isa Reyes,     We regret to inform you that you have been dismissed from the Bariatric Program at [facility]) due to non-compliance with the program's requirements. Specifically, this decision was made following your failure to attend a scheduled appointment on 6/26/2025 .     As part of our commitment to ensuring the highest quality of care and outcomes for our patients, I wanted to take a moment to clarify our policies regarding compliance within the Bariatric Program at [facility].    Our program has strict guidelines in place to dismiss patients who no-show for any pre-operative testing, visits, or other required appointments. This policy exists due to two primary reasons:  1. Capacity Constraints:  With a large number of referrals and limited operating room availability, it is critical that we maximize our resources. Non-compliance with pre-operative requirements limits our ability to provide timely care to other patients in need.  2. Patient Outcomes:  Research has consistently shown that non-compliance during the pre-operative period is associated with higher rates of non-compliance after surgery. This can lead to serious post-operative complications such as:   Vitamin deficiencies, Gastric leaks, Gastric obstructions, and other post-surgical complications.    To ensure the best possible outcomes, we must prioritize patients who demonstrate readiness and adherence to program requirements.    If you have been dismissed from the program, please know that this decision is not permanent. You may be eligible for re-entry 12 months after the dismissal date. At that time, your primary care provider (PCP) can submit a new referral on your behalf for re-evaluation and potential re-entry into the program.    We understand that this policy may feel  challenging, but it is in place to ensure the safety and success of every patient in our program. If you have any questions or concerns, please do not hesitate to reach out to our office.    Thank you for your understanding and for taking the steps necessary to achieve your health goals.      Sincerely,    Flor Gee MA  Bariatric Patient Advocate